# Patient Record
Sex: MALE | Race: WHITE | NOT HISPANIC OR LATINO | Employment: OTHER | URBAN - METROPOLITAN AREA
[De-identification: names, ages, dates, MRNs, and addresses within clinical notes are randomized per-mention and may not be internally consistent; named-entity substitution may affect disease eponyms.]

---

## 2017-09-21 ENCOUNTER — APPOINTMENT (OUTPATIENT)
Dept: LAB | Facility: HOSPITAL | Age: 67
End: 2017-09-21
Attending: UROLOGY

## 2017-09-21 ENCOUNTER — TRANSCRIBE ORDERS (OUTPATIENT)
Dept: ADMINISTRATIVE | Facility: HOSPITAL | Age: 67
End: 2017-09-21

## 2017-09-21 DIAGNOSIS — R97.20 ELEVATED PROSTATE SPECIFIC ANTIGEN (PSA): Primary | ICD-10-CM

## 2017-09-21 DIAGNOSIS — R97.20 ELEVATED PROSTATE SPECIFIC ANTIGEN (PSA): ICD-10-CM

## 2017-09-21 PROCEDURE — 36415 COLL VENOUS BLD VENIPUNCTURE: CPT

## 2017-09-21 PROCEDURE — 84153 ASSAY OF PSA TOTAL: CPT

## 2017-09-21 PROCEDURE — 84154 ASSAY OF PSA FREE: CPT

## 2017-09-23 LAB
PSA FREE MFR SERPL: 14.8 %
PSA FREE SERPL-MCNC: 0.37 NG/ML
PSA SERPL-MCNC: 2.5 NG/ML (ref 0–4)

## 2018-01-18 ENCOUNTER — APPOINTMENT (EMERGENCY)
Dept: RADIOLOGY | Facility: HOSPITAL | Age: 68
DRG: 501 | End: 2018-01-18
Payer: MEDICARE

## 2018-01-18 ENCOUNTER — HOSPITAL ENCOUNTER (INPATIENT)
Facility: HOSPITAL | Age: 68
LOS: 5 days | Discharge: HOME HEALTH | DRG: 501 | End: 2018-01-23
Attending: ORTHOPAEDIC SURGERY | Admitting: ORTHOPAEDIC SURGERY
Payer: MEDICARE

## 2018-01-18 DIAGNOSIS — L02.512 ABSCESS OF LEFT HAND: Primary | ICD-10-CM

## 2018-01-18 DIAGNOSIS — L02.414 ABSCESS OF LEFT FOREARM: ICD-10-CM

## 2018-01-18 LAB
ANION GAP SERPL CALCULATED.3IONS-SCNC: 7 MMOL/L (ref 4–13)
BUN SERPL-MCNC: 14 MG/DL (ref 5–25)
CALCIUM SERPL-MCNC: 8.5 MG/DL (ref 8.3–10.1)
CHLORIDE SERPL-SCNC: 102 MMOL/L (ref 100–108)
CO2 SERPL-SCNC: 29 MMOL/L (ref 21–32)
CREAT SERPL-MCNC: 0.67 MG/DL (ref 0.6–1.3)
CRP SERPL QL: 30.9 MG/L
ERYTHROCYTE [DISTWIDTH] IN BLOOD BY AUTOMATED COUNT: 14.4 % (ref 11.6–15.1)
ERYTHROCYTE [SEDIMENTATION RATE] IN BLOOD: 18 MM/HOUR (ref 0–10)
GFR SERPL CREATININE-BSD FRML MDRD: 101 ML/MIN/1.73SQ M
GLUCOSE SERPL-MCNC: 91 MG/DL (ref 65–140)
HCT VFR BLD AUTO: 43.9 % (ref 36.5–49.3)
HGB BLD-MCNC: 15 G/DL (ref 12–17)
MCH RBC QN AUTO: 30.4 PG (ref 26.8–34.3)
MCHC RBC AUTO-ENTMCNC: 34.2 G/DL (ref 31.4–37.4)
MCV RBC AUTO: 89 FL (ref 82–98)
PLATELET # BLD AUTO: 260 THOUSANDS/UL (ref 149–390)
PMV BLD AUTO: 9.9 FL (ref 8.9–12.7)
POTASSIUM SERPL-SCNC: 4 MMOL/L (ref 3.5–5.3)
RBC # BLD AUTO: 4.94 MILLION/UL (ref 3.88–5.62)
SODIUM SERPL-SCNC: 138 MMOL/L (ref 136–145)
WBC # BLD AUTO: 10.91 THOUSAND/UL (ref 4.31–10.16)

## 2018-01-18 PROCEDURE — 36415 COLL VENOUS BLD VENIPUNCTURE: CPT | Performed by: STUDENT IN AN ORGANIZED HEALTH CARE EDUCATION/TRAINING PROGRAM

## 2018-01-18 PROCEDURE — 73080 X-RAY EXAM OF ELBOW: CPT

## 2018-01-18 PROCEDURE — 80048 BASIC METABOLIC PNL TOTAL CA: CPT | Performed by: STUDENT IN AN ORGANIZED HEALTH CARE EDUCATION/TRAINING PROGRAM

## 2018-01-18 PROCEDURE — 87147 CULTURE TYPE IMMUNOLOGIC: CPT | Performed by: STUDENT IN AN ORGANIZED HEALTH CARE EDUCATION/TRAINING PROGRAM

## 2018-01-18 PROCEDURE — 73120 X-RAY EXAM OF HAND: CPT

## 2018-01-18 PROCEDURE — 85027 COMPLETE CBC AUTOMATED: CPT | Performed by: STUDENT IN AN ORGANIZED HEALTH CARE EDUCATION/TRAINING PROGRAM

## 2018-01-18 PROCEDURE — 87186 SC STD MICRODIL/AGAR DIL: CPT | Performed by: STUDENT IN AN ORGANIZED HEALTH CARE EDUCATION/TRAINING PROGRAM

## 2018-01-18 PROCEDURE — 87070 CULTURE OTHR SPECIMN AEROBIC: CPT | Performed by: STUDENT IN AN ORGANIZED HEALTH CARE EDUCATION/TRAINING PROGRAM

## 2018-01-18 PROCEDURE — 86140 C-REACTIVE PROTEIN: CPT | Performed by: STUDENT IN AN ORGANIZED HEALTH CARE EDUCATION/TRAINING PROGRAM

## 2018-01-18 PROCEDURE — 87205 SMEAR GRAM STAIN: CPT | Performed by: STUDENT IN AN ORGANIZED HEALTH CARE EDUCATION/TRAINING PROGRAM

## 2018-01-18 PROCEDURE — 85652 RBC SED RATE AUTOMATED: CPT | Performed by: STUDENT IN AN ORGANIZED HEALTH CARE EDUCATION/TRAINING PROGRAM

## 2018-01-18 RX ORDER — NICOTINE 21 MG/24HR
1 PATCH, TRANSDERMAL 24 HOURS TRANSDERMAL DAILY
Status: DISCONTINUED | OUTPATIENT
Start: 2018-01-19 | End: 2018-01-23 | Stop reason: HOSPADM

## 2018-01-18 RX ORDER — MORPHINE SULFATE 2 MG/ML
2 INJECTION, SOLUTION INTRAMUSCULAR; INTRAVENOUS EVERY 2 HOUR PRN
Status: DISCONTINUED | OUTPATIENT
Start: 2018-01-18 | End: 2018-01-23 | Stop reason: HOSPADM

## 2018-01-18 RX ORDER — DOCUSATE SODIUM 100 MG/1
100 CAPSULE, LIQUID FILLED ORAL 2 TIMES DAILY
Status: DISCONTINUED | OUTPATIENT
Start: 2018-01-19 | End: 2018-01-23 | Stop reason: HOSPADM

## 2018-01-18 RX ORDER — ONDANSETRON 2 MG/ML
4 INJECTION INTRAMUSCULAR; INTRAVENOUS EVERY 6 HOURS PRN
Status: DISCONTINUED | OUTPATIENT
Start: 2018-01-18 | End: 2018-01-23 | Stop reason: HOSPADM

## 2018-01-18 RX ORDER — OXYCODONE HYDROCHLORIDE 5 MG/1
5 TABLET ORAL EVERY 4 HOURS PRN
Status: DISCONTINUED | OUTPATIENT
Start: 2018-01-18 | End: 2018-01-23 | Stop reason: HOSPADM

## 2018-01-18 RX ORDER — SENNOSIDES 8.6 MG
1 TABLET ORAL DAILY
Status: DISCONTINUED | OUTPATIENT
Start: 2018-01-19 | End: 2018-01-23 | Stop reason: HOSPADM

## 2018-01-18 RX ORDER — ACETAMINOPHEN 325 MG/1
650 TABLET ORAL EVERY 6 HOURS SCHEDULED
Status: DISCONTINUED | OUTPATIENT
Start: 2018-01-18 | End: 2018-01-23 | Stop reason: HOSPADM

## 2018-01-18 RX ORDER — LIDOCAINE HYDROCHLORIDE 10 MG/ML
30 INJECTION, SOLUTION EPIDURAL; INFILTRATION; INTRACAUDAL; PERINEURAL ONCE
Status: COMPLETED | OUTPATIENT
Start: 2018-01-18 | End: 2018-01-18

## 2018-01-18 RX ADMIN — ACETAMINOPHEN 650 MG: 325 TABLET, FILM COATED ORAL at 21:32

## 2018-01-18 RX ADMIN — LIDOCAINE HYDROCHLORIDE 30 ML: 10 INJECTION, SOLUTION EPIDURAL; INFILTRATION; INTRACAUDAL; PERINEURAL at 22:44

## 2018-01-19 ENCOUNTER — ANESTHESIA EVENT (INPATIENT)
Dept: PERIOP | Facility: HOSPITAL | Age: 68
DRG: 501 | End: 2018-01-19
Payer: MEDICARE

## 2018-01-19 ENCOUNTER — ANESTHESIA (INPATIENT)
Dept: PERIOP | Facility: HOSPITAL | Age: 68
DRG: 501 | End: 2018-01-19
Payer: MEDICARE

## 2018-01-19 PROBLEM — L02.512 ABSCESS OF LEFT HAND: Status: ACTIVE | Noted: 2018-01-18

## 2018-01-19 PROBLEM — L02.414 ABSCESS OF LEFT FOREARM: Status: ACTIVE | Noted: 2018-01-19

## 2018-01-19 LAB
ANION GAP SERPL CALCULATED.3IONS-SCNC: 6 MMOL/L (ref 4–13)
BUN SERPL-MCNC: 14 MG/DL (ref 5–25)
CALCIUM SERPL-MCNC: 8.6 MG/DL (ref 8.3–10.1)
CHLORIDE SERPL-SCNC: 102 MMOL/L (ref 100–108)
CO2 SERPL-SCNC: 28 MMOL/L (ref 21–32)
CREAT SERPL-MCNC: 0.67 MG/DL (ref 0.6–1.3)
ERYTHROCYTE [DISTWIDTH] IN BLOOD BY AUTOMATED COUNT: 14.5 % (ref 11.6–15.1)
GFR SERPL CREATININE-BSD FRML MDRD: 101 ML/MIN/1.73SQ M
GLUCOSE SERPL-MCNC: 88 MG/DL (ref 65–140)
HCT VFR BLD AUTO: 44.3 % (ref 36.5–49.3)
HGB BLD-MCNC: 14.8 G/DL (ref 12–17)
MCH RBC QN AUTO: 30 PG (ref 26.8–34.3)
MCHC RBC AUTO-ENTMCNC: 33.4 G/DL (ref 31.4–37.4)
MCV RBC AUTO: 90 FL (ref 82–98)
PLATELET # BLD AUTO: 262 THOUSANDS/UL (ref 149–390)
PMV BLD AUTO: 10.2 FL (ref 8.9–12.7)
POTASSIUM SERPL-SCNC: 4 MMOL/L (ref 3.5–5.3)
RBC # BLD AUTO: 4.94 MILLION/UL (ref 3.88–5.62)
SODIUM SERPL-SCNC: 136 MMOL/L (ref 136–145)
WBC # BLD AUTO: 10.68 THOUSAND/UL (ref 4.31–10.16)

## 2018-01-19 PROCEDURE — 0KB80ZZ EXCISION OF LEFT UPPER ARM MUSCLE, OPEN APPROACH: ICD-10-PCS | Performed by: ORTHOPAEDIC SURGERY

## 2018-01-19 PROCEDURE — 80048 BASIC METABOLIC PNL TOTAL CA: CPT | Performed by: STUDENT IN AN ORGANIZED HEALTH CARE EDUCATION/TRAINING PROGRAM

## 2018-01-19 PROCEDURE — 87070 CULTURE OTHR SPECIMN AEROBIC: CPT | Performed by: ORTHOPAEDIC SURGERY

## 2018-01-19 PROCEDURE — 85027 COMPLETE CBC AUTOMATED: CPT | Performed by: STUDENT IN AN ORGANIZED HEALTH CARE EDUCATION/TRAINING PROGRAM

## 2018-01-19 PROCEDURE — 87147 CULTURE TYPE IMMUNOLOGIC: CPT | Performed by: ORTHOPAEDIC SURGERY

## 2018-01-19 PROCEDURE — 87186 SC STD MICRODIL/AGAR DIL: CPT | Performed by: ORTHOPAEDIC SURGERY

## 2018-01-19 PROCEDURE — 99285 EMERGENCY DEPT VISIT HI MDM: CPT

## 2018-01-19 PROCEDURE — 87205 SMEAR GRAM STAIN: CPT | Performed by: ORTHOPAEDIC SURGERY

## 2018-01-19 PROCEDURE — 87116 MYCOBACTERIA CULTURE: CPT | Performed by: ORTHOPAEDIC SURGERY

## 2018-01-19 PROCEDURE — 87102 FUNGUS ISOLATION CULTURE: CPT | Performed by: ORTHOPAEDIC SURGERY

## 2018-01-19 PROCEDURE — 87206 SMEAR FLUORESCENT/ACID STAI: CPT | Performed by: ORTHOPAEDIC SURGERY

## 2018-01-19 PROCEDURE — 87075 CULTR BACTERIA EXCEPT BLOOD: CPT | Performed by: ORTHOPAEDIC SURGERY

## 2018-01-19 PROCEDURE — 87176 TISSUE HOMOGENIZATION CULTR: CPT | Performed by: ORTHOPAEDIC SURGERY

## 2018-01-19 PROCEDURE — 0KBD0ZZ EXCISION OF LEFT HAND MUSCLE, OPEN APPROACH: ICD-10-PCS | Performed by: ORTHOPAEDIC SURGERY

## 2018-01-19 RX ORDER — SODIUM CHLORIDE 9 MG/ML
75 INJECTION, SOLUTION INTRAVENOUS CONTINUOUS
Status: DISCONTINUED | OUTPATIENT
Start: 2018-01-19 | End: 2018-01-23 | Stop reason: HOSPADM

## 2018-01-19 RX ORDER — TRAMADOL HYDROCHLORIDE 50 MG/1
50 TABLET ORAL EVERY 6 HOURS SCHEDULED
Status: DISCONTINUED | OUTPATIENT
Start: 2018-01-19 | End: 2018-01-23 | Stop reason: HOSPADM

## 2018-01-19 RX ORDER — BUPROPION HYDROCHLORIDE 100 MG/1
50 TABLET ORAL 2 TIMES DAILY
Status: DISCONTINUED | OUTPATIENT
Start: 2018-01-19 | End: 2018-01-23 | Stop reason: HOSPADM

## 2018-01-19 RX ORDER — EPHEDRINE SULFATE 50 MG/ML
INJECTION, SOLUTION INTRAVENOUS AS NEEDED
Status: DISCONTINUED | OUTPATIENT
Start: 2018-01-19 | End: 2018-01-19 | Stop reason: SURG

## 2018-01-19 RX ORDER — ONDANSETRON 2 MG/ML
INJECTION INTRAMUSCULAR; INTRAVENOUS AS NEEDED
Status: DISCONTINUED | OUTPATIENT
Start: 2018-01-19 | End: 2018-01-19 | Stop reason: SURG

## 2018-01-19 RX ORDER — OMEPRAZOLE 20 MG/1
25 CAPSULE, DELAYED RELEASE ORAL 2 TIMES DAILY
COMMUNITY
End: 2020-03-24 | Stop reason: HOSPADM

## 2018-01-19 RX ORDER — PANTOPRAZOLE SODIUM 20 MG/1
20 TABLET, DELAYED RELEASE ORAL
Status: DISCONTINUED | OUTPATIENT
Start: 2018-01-20 | End: 2018-01-23 | Stop reason: HOSPADM

## 2018-01-19 RX ORDER — FENTANYL CITRATE/PF 50 MCG/ML
25 SYRINGE (ML) INJECTION
Status: DISCONTINUED | OUTPATIENT
Start: 2018-01-19 | End: 2018-01-19 | Stop reason: HOSPADM

## 2018-01-19 RX ORDER — ESCITALOPRAM OXALATE 20 MG/1
50 TABLET ORAL 2 TIMES DAILY
COMMUNITY
End: 2020-03-24 | Stop reason: HOSPADM

## 2018-01-19 RX ORDER — PRAVASTATIN SODIUM 20 MG
20 TABLET ORAL DAILY
COMMUNITY
End: 2020-03-24 | Stop reason: HOSPADM

## 2018-01-19 RX ORDER — PRAVASTATIN SODIUM 20 MG
20 TABLET ORAL DAILY
Status: DISCONTINUED | OUTPATIENT
Start: 2018-01-20 | End: 2018-01-23 | Stop reason: HOSPADM

## 2018-01-19 RX ORDER — FLUOXETINE 10 MG/1
10 CAPSULE ORAL DAILY
Status: DISCONTINUED | OUTPATIENT
Start: 2018-01-20 | End: 2018-01-23 | Stop reason: HOSPADM

## 2018-01-19 RX ORDER — LIDOCAINE HYDROCHLORIDE 10 MG/ML
INJECTION, SOLUTION INFILTRATION; PERINEURAL AS NEEDED
Status: DISCONTINUED | OUTPATIENT
Start: 2018-01-19 | End: 2018-01-19 | Stop reason: SURG

## 2018-01-19 RX ORDER — METHOCARBAMOL 750 MG/1
750 TABLET, FILM COATED ORAL 2 TIMES DAILY
COMMUNITY
End: 2020-03-24 | Stop reason: HOSPADM

## 2018-01-19 RX ORDER — PROPOFOL 10 MG/ML
INJECTION, EMULSION INTRAVENOUS AS NEEDED
Status: DISCONTINUED | OUTPATIENT
Start: 2018-01-19 | End: 2018-01-19 | Stop reason: SURG

## 2018-01-19 RX ORDER — FENTANYL CITRATE 50 UG/ML
INJECTION, SOLUTION INTRAMUSCULAR; INTRAVENOUS AS NEEDED
Status: DISCONTINUED | OUTPATIENT
Start: 2018-01-19 | End: 2018-01-19 | Stop reason: SURG

## 2018-01-19 RX ORDER — ONDANSETRON 2 MG/ML
4 INJECTION INTRAMUSCULAR; INTRAVENOUS ONCE AS NEEDED
Status: DISCONTINUED | OUTPATIENT
Start: 2018-01-19 | End: 2018-01-19 | Stop reason: HOSPADM

## 2018-01-19 RX ORDER — BUPROPION HYDROCHLORIDE 100 MG/1
50 TABLET ORAL 2 TIMES DAILY
COMMUNITY
End: 2020-03-24 | Stop reason: HOSPADM

## 2018-01-19 RX ORDER — METHOCARBAMOL 750 MG/1
750 TABLET, FILM COATED ORAL 2 TIMES DAILY
Status: DISCONTINUED | OUTPATIENT
Start: 2018-01-19 | End: 2018-01-23 | Stop reason: HOSPADM

## 2018-01-19 RX ORDER — MAGNESIUM HYDROXIDE 1200 MG/15ML
LIQUID ORAL AS NEEDED
Status: DISCONTINUED | OUTPATIENT
Start: 2018-01-19 | End: 2018-01-19 | Stop reason: HOSPADM

## 2018-01-19 RX ORDER — FLUOXETINE 10 MG/1
10 CAPSULE ORAL DAILY
COMMUNITY
End: 2020-03-24 | Stop reason: HOSPADM

## 2018-01-19 RX ORDER — METHOCARBAMOL 750 MG/1
750 TABLET, FILM COATED ORAL EVERY 6 HOURS PRN
Status: DISCONTINUED | OUTPATIENT
Start: 2018-01-19 | End: 2018-01-19

## 2018-01-19 RX ORDER — CEFAZOLIN SODIUM 1 G/3ML
INJECTION, POWDER, FOR SOLUTION INTRAMUSCULAR; INTRAVENOUS AS NEEDED
Status: DISCONTINUED | OUTPATIENT
Start: 2018-01-19 | End: 2018-01-19 | Stop reason: SURG

## 2018-01-19 RX ORDER — ACETAMINOPHEN 325 MG/1
650 TABLET ORAL EVERY 6 HOURS PRN
Status: DISCONTINUED | OUTPATIENT
Start: 2018-01-19 | End: 2018-01-23 | Stop reason: HOSPADM

## 2018-01-19 RX ORDER — SODIUM CHLORIDE, SODIUM LACTATE, POTASSIUM CHLORIDE, CALCIUM CHLORIDE 600; 310; 30; 20 MG/100ML; MG/100ML; MG/100ML; MG/100ML
INJECTION, SOLUTION INTRAVENOUS CONTINUOUS PRN
Status: DISCONTINUED | OUTPATIENT
Start: 2018-01-19 | End: 2018-01-19 | Stop reason: SURG

## 2018-01-19 RX ADMIN — NICOTINE 1 PATCH: 21 PATCH, EXTENDED RELEASE TRANSDERMAL at 09:16

## 2018-01-19 RX ADMIN — FENTANYL CITRATE 50 MCG: 50 INJECTION, SOLUTION INTRAMUSCULAR; INTRAVENOUS at 16:29

## 2018-01-19 RX ADMIN — METHOCARBAMOL 750 MG: 750 TABLET ORAL at 19:12

## 2018-01-19 RX ADMIN — OXYCODONE HYDROCHLORIDE 5 MG: 5 TABLET ORAL at 04:34

## 2018-01-19 RX ADMIN — TRAMADOL HYDROCHLORIDE 50 MG: 50 TABLET, FILM COATED ORAL at 19:12

## 2018-01-19 RX ADMIN — ONDANSETRON 4 MG: 2 INJECTION INTRAMUSCULAR; INTRAVENOUS at 23:36

## 2018-01-19 RX ADMIN — HYDROMORPHONE HYDROCHLORIDE 0.2 MG: 1 INJECTION, SOLUTION INTRAMUSCULAR; INTRAVENOUS; SUBCUTANEOUS at 17:30

## 2018-01-19 RX ADMIN — ACETAMINOPHEN 650 MG: 325 TABLET, FILM COATED ORAL at 05:58

## 2018-01-19 RX ADMIN — METHOCARBAMOL 750 MG: 750 TABLET ORAL at 07:39

## 2018-01-19 RX ADMIN — HYDROMORPHONE HYDROCHLORIDE 0.2 MG: 1 INJECTION, SOLUTION INTRAMUSCULAR; INTRAVENOUS; SUBCUTANEOUS at 17:15

## 2018-01-19 RX ADMIN — HYDROMORPHONE HYDROCHLORIDE 0.2 MG: 1 INJECTION, SOLUTION INTRAMUSCULAR; INTRAVENOUS; SUBCUTANEOUS at 17:25

## 2018-01-19 RX ADMIN — CEFAZOLIN 2000 MG: 1 INJECTION, POWDER, FOR SOLUTION INTRAVENOUS at 16:06

## 2018-01-19 RX ADMIN — FENTANYL CITRATE 25 MCG: 50 INJECTION INTRAMUSCULAR; INTRAVENOUS at 16:56

## 2018-01-19 RX ADMIN — SODIUM CHLORIDE 75 ML/HR: 0.9 INJECTION, SOLUTION INTRAVENOUS at 17:32

## 2018-01-19 RX ADMIN — EPHEDRINE SULFATE 10 MG: 50 INJECTION, SOLUTION INTRAMUSCULAR; INTRAVENOUS; SUBCUTANEOUS at 16:10

## 2018-01-19 RX ADMIN — CEFAZOLIN SODIUM 2000 MG: 2 SOLUTION INTRAVENOUS at 23:36

## 2018-01-19 RX ADMIN — OXYCODONE HYDROCHLORIDE 5 MG: 5 TABLET ORAL at 21:07

## 2018-01-19 RX ADMIN — LIDOCAINE HYDROCHLORIDE 50 MG: 10 INJECTION, SOLUTION INFILTRATION; PERINEURAL at 15:44

## 2018-01-19 RX ADMIN — HYDROMORPHONE HYDROCHLORIDE 0.2 MG: 1 INJECTION, SOLUTION INTRAMUSCULAR; INTRAVENOUS; SUBCUTANEOUS at 17:20

## 2018-01-19 RX ADMIN — ACETAMINOPHEN 650 MG: 325 TABLET, FILM COATED ORAL at 12:34

## 2018-01-19 RX ADMIN — FENTANYL CITRATE 25 MCG: 50 INJECTION INTRAMUSCULAR; INTRAVENOUS at 16:59

## 2018-01-19 RX ADMIN — TRAMADOL HYDROCHLORIDE 50 MG: 50 TABLET, FILM COATED ORAL at 23:37

## 2018-01-19 RX ADMIN — FENTANYL CITRATE 25 MCG: 50 INJECTION INTRAMUSCULAR; INTRAVENOUS at 16:49

## 2018-01-19 RX ADMIN — HYDROMORPHONE HYDROCHLORIDE 0.2 MG: 1 INJECTION, SOLUTION INTRAMUSCULAR; INTRAVENOUS; SUBCUTANEOUS at 17:05

## 2018-01-19 RX ADMIN — ONDANSETRON 4 MG: 2 INJECTION INTRAMUSCULAR; INTRAVENOUS at 16:15

## 2018-01-19 RX ADMIN — PROPOFOL 200 MG: 10 INJECTION, EMULSION INTRAVENOUS at 15:44

## 2018-01-19 RX ADMIN — HYDROMORPHONE HYDROCHLORIDE 0.2 MG: 1 INJECTION, SOLUTION INTRAMUSCULAR; INTRAVENOUS; SUBCUTANEOUS at 17:10

## 2018-01-19 RX ADMIN — ACETAMINOPHEN 650 MG: 325 TABLET, FILM COATED ORAL at 23:37

## 2018-01-19 RX ADMIN — HYDROMORPHONE HYDROCHLORIDE 0.2 MG: 1 INJECTION, SOLUTION INTRAMUSCULAR; INTRAVENOUS; SUBCUTANEOUS at 17:35

## 2018-01-19 RX ADMIN — SODIUM CHLORIDE, SODIUM LACTATE, POTASSIUM CHLORIDE, AND CALCIUM CHLORIDE: .6; .31; .03; .02 INJECTION, SOLUTION INTRAVENOUS at 15:40

## 2018-01-19 RX ADMIN — Medication 3 G: at 06:25

## 2018-01-19 RX ADMIN — HYDROMORPHONE HYDROCHLORIDE 0.2 MG: 1 INJECTION, SOLUTION INTRAMUSCULAR; INTRAVENOUS; SUBCUTANEOUS at 17:40

## 2018-01-19 RX ADMIN — OXYCODONE HYDROCHLORIDE 5 MG: 5 TABLET ORAL at 09:16

## 2018-01-19 RX ADMIN — METHOCARBAMOL 750 MG: 750 TABLET ORAL at 01:55

## 2018-01-19 RX ADMIN — FENTANYL CITRATE 50 MCG: 50 INJECTION, SOLUTION INTRAMUSCULAR; INTRAVENOUS at 16:01

## 2018-01-19 RX ADMIN — Medication 3 G: at 00:34

## 2018-01-19 RX ADMIN — OXYCODONE HYDROCHLORIDE 5 MG: 5 TABLET ORAL at 00:34

## 2018-01-19 RX ADMIN — BUPROPION HYDROCHLORIDE 50 MG: 100 TABLET, FILM COATED ORAL at 21:08

## 2018-01-19 RX ADMIN — Medication 3 G: at 12:34

## 2018-01-19 RX ADMIN — FENTANYL CITRATE 25 MCG: 50 INJECTION INTRAMUSCULAR; INTRAVENOUS at 16:53

## 2018-01-19 NOTE — H&P
Orthopedics   Neponsetdanika Schreiber  74 y o  male MRN: [id number]  Unit/Bed#: ED 14      Chief Complaint:   left Hand and elbow pain    HPI:   74 y  o male RHDcomplaining of left hand and elbow erythema and pain  Patient says he cut the dorsal aspect of his thumb about 1 week ago on a cut off wheel  About 4 days ago he began to notice an area under the edge of his bandage that looked like a "pimple " He admits to popping the pustule with a pin a couple times and expressing pus  He began to have subjective chills last night and reported to the ED in [place]  He began to have swelling near his elbow 3 days ago  Pain is well localized to the thumb and elbow, is tender to palpation and improves with rest     Patient offers no other complaints at this time  Denies n/v/sob/cp/ha    Review Of Systems:   · Skin: See exam below  · Neuro: See HPI  · Musculoskeletal: See HPI  · 14 point review of systems negative except as stated above     Past Medical History:   Past Medical History:   Diagnosis Date    GERD (gastroesophageal reflux disease)     Hyperlipidemia     Hypertension        Past Surgical History:   History reviewed  No pertinent surgical history  Family History:  Family history reviewed and non-contributory  History reviewed  No pertinent family history  Social History:  Social History     Social History    Marital status: Single     Spouse name: N/A    Number of children: N/A    Years of education: N/A     Social History Main Topics    Smoking status: Current Every Day Smoker     Packs/day: 0 50     Types: Cigarettes    Smokeless tobacco: Never Used    Alcohol use No    Drug use: No    Sexual activity: Not Asked     Other Topics Concern    None     Social History Narrative    None       Allergies:   No Known Allergies        Labs:  No results found for: HCT, HGB, PT, INR, WBC, ESR, CRP    Meds:  No current facility-administered medications for this encounter     No current outpatient prescriptions on file  Blood Culture:   No results found for: BLOODCX    Wound Culture:   No results found for: WOUNDCULT    Ins and Outs:  No intake/output data recorded  Physical Exam:   /94 (BP Location: Right arm)   Pulse 77   Temp 99 2 °F (37 3 °C) (Oral)   Resp 20   Wt 98 9 kg (218 lb)   SpO2 97%   Gen: Alert and oriented to person, place, time  HEENT: EOMI, eyes clear, moist mucus membranes, hearing intact  Respiratory: Bilateral chest rise  No audible wheezing found  Cardiovascular: Regular Rate and Rhythm  Abdomen: soft nontender/nondistended  · Musculoskeletal: leftUpper Extremity  · Skin: Erythema over thenar eminence with 2 pustules and a 4x4cm area of erythema distal to elbow  · ROM nontender at thumb or elbow  · TTP over areas of erythema  · Sensation intact Radial, Ulna and Median  · 5/5 motor to Radial, Ulna and Median    Radiology:   I personally reviewed the films  X-rays of left elbow and hand show no osseus abnormalities    Procedure:  Patient underwent bedside incision and drainage of his left hand  15cc of 1% lidocaine without epinephrine was used to anesthetize the region of cellulitis  A 15 blade was use to make a poke-hole incision over the pustule and pus was expressed from the wound  Cultures were taken from deep within the abscess and 1/4 inch packing tape was placed in the wound  Wound was covered with Xeroform, gauze, Krilex and a splint was placed for immobilization  Patient tolerated the procedure well and was neurovascularly intact after the procedure      _*_*_*_*_*_*_*_*_*_*_*_*_*_*_*_*_*_*_*_*_*_*_*_*_*_*_*_*_*_*_*_*_*_*_*_*_*_*_*_*_*    Assessment:  74 y  o male with  left hand and elbow cellulitis      Plan:   · Start Unasyn  · F/U Wound Cx  · Nonweight bearing to left upper extremity in splint  · Heat to affected area  · Analgesics for pain  · Dispo: Ortho will follow      Precious Wright MD

## 2018-01-19 NOTE — PROGRESS NOTES
Orthopedics   Manjinder Kee  74 y o  male MRN: [id number]  Unit/Bed#: CW3 345-01    HPI:   74 y  o male with L thenar eminance abscess and L elbow abscess  In splint  Labs:    0  Lab Value Date/Time   HCT 44 3 01/19/2018 0530   HCT 43 9 01/18/2018 2126   HGB 14 8 01/19/2018 0530   HGB 15 0 01/18/2018 2126   WBC 10 68 (H) 01/19/2018 0530   WBC 10 91 (H) 01/18/2018 2126   ESR 18 (H) 01/18/2018 2126   CRP 30 9 (H) 01/18/2018 2126       Meds:    Current Facility-Administered Medications:     acetaminophen (TYLENOL) tablet 650 mg, 650 mg, Oral, Q6H [facility], Carmelina Arroyo MD, 650 mg at 01/19/18 0558    ampicillin-sulbactam (UNASYN) 3 g in sodium chloride 0 9 % 100 mL IVPB, 3 g, Intravenous, Q6H, Carmelina Arroyo MD, Last Rate: 200 mL/hr at 01/19/18 0625, 3 g at 01/19/18 1856    docusate sodium (COLACE) capsule 100 mg, 100 mg, Oral, BID, Carmelina Arroyo MD    enoxaparin (LOVENOX) subcutaneous injection 40 mg, 40 mg, Subcutaneous, Daily, Carmelina Arroyo MD    influenza inactivated quadrivalent vaccine (FLULAVAL) IM injection 0 5 mL, 0 5 mL, Intramuscular, Prior to discharge, Macario Mcbride MD    methocarbamol (ROBAXIN) tablet 750 mg, 750 mg, Oral, Q6H PRN, Carmelina Arroyo MD, 750 mg at 01/19/18 0155    morphine injection 2 mg, 2 mg, Intravenous, Q2H PRN, Carmelina Arroyo MD  [facility]  nicotine (NICODERM CQ) 21 mg/24 hr TD 24 hr patch 1 patch, 1 patch, Transdermal, Daily, Carmelina Arroyo MD    ondansetron [facility]) injection 4 mg, 4 mg, Intravenous, Q6H PRN, Carmelina Arroyo MD    oxyCODONE (ROXICODONE) IR tablet 5 mg, 5 mg, Oral, Q4H PRN, Carmelina Arroyo MD, 5 mg at 01/19/18 0434    senna (SENOKOT) tablet 8 6 mg, 1 tablet, Oral, Daily, Carmelina Arroyo MD    Blood Culture:   No results found for: BLOODCX    Wound Culture:   No results found for: WOUNDCULT    Ins and Outs:  No intake/output data recorded            Physical Exam:   BP (!) 172/93 (BP Location: Right arm)   Pulse 70 Temp 97 6 °F (36 4 °C) (Oral)   Resp 18   Ht 5' 10" (1 778 m)   Wt 98 9 kg (218 lb)   SpO2 94%   BMI 31 28 kg/m²   Gen: Alert and oriented to person, place, time  Abdomen: soft nontender/nondistended  · Musculoskeletal: leftUpper Extremity  · Splint in place  · Erythema present over elbow and thenar eminance  · TTP over abscess and wound of L hand and elbow  · Sensation intact Radial, Ulna and Median  · 5/5 motor to Radial, Ulna and Median    *_*_*_*_*_*_*_*_*_*_*_*_*_*_*_*_*_*_*_*_*_*_*_*_*_*_*_*_*_*_*_*_*_*_*_*_*_*_*_*_*    Assessment:  74 y  o male with  left hand and elbow cellulitis and abscess    Plan:   · abx: Unasyn  · F/U Wound Cx  · Nonweight bearing to left upper extremity in splint  · Heat to affected area  · Analgesics for pain  · To OR for release of L elbow abscess  · Dispo: Ortho will follow      Richard Aguilar MD

## 2018-01-19 NOTE — ANESTHESIA PREPROCEDURE EVALUATION
Review of Systems/Medical History  Patient summary reviewed  Chart reviewed      Cardiovascular  Hyperlipidemia, Hypertension ,    Pulmonary  Smoker cigarette smoker  Cumulative Pack Years: 46, COPD mild- PRN medicaiton , Shortness of breath,        GI/Hepatic    GERD ,        Negative  ROS        Endo/Other    Obesity    GYN       Hematology  Negative hematology ROS      Musculoskeletal  Back pain , lumbar pain, Osteoarthritis,   Comment: Left hand abscess      Neurology  Negative neurology ROS      Psychology   Depression , being treated for depression,              Physical Exam    Airway    Mallampati score: II  TM Distance: >3 FB  Neck ROM: full     Dental   No notable dental hx     Cardiovascular  Rhythm: regular, Rate: normal,     Pulmonary  Breath sounds clear to auscultation,     Other Findings        Anesthesia Plan  ASA Score- 3     Anesthesia Type- general with ASA Monitors  Additional Monitors:   Airway Plan: LMA  Plan Factors-    Induction- intravenous  Postoperative Plan-     Informed Consent- Anesthetic plan and risks discussed with patient  I personally reviewed this patient with the CRNA  Discussed and agreed on the Anesthesia Plan with the CRNA  Moises Dodge

## 2018-01-19 NOTE — CASE MANAGEMENT
Initial Clinical Review    Admission: Date/Time/Statement: 1/18/18 @ 2329     Orders Placed This Encounter   Procedures    Inpatient Admission     Standing Status:   Standing     Number of Occurrences:   1     Order Specific Question:   Admitting Physician     Answer:   Biju Baires     Order Specific Question:   Level of Care     Answer:   Med Surg [16]     Order Specific Question:   Estimated length of stay     Answer:   More than 2 Midnights     Order Specific Question:   Certification     Answer:   I certify that inpatient services are medically necessary for this patient for a duration of greater than two midnights  See H&P and MD Progress Notes for additional information about the patient's course of treatment  ED: Date/Time/Mode of Arrival:   ED Arrival Information     Expected Arrival Acuity Means of Arrival Escorted By Service Admission Type    1/18/2018 18:25 1/18/2018 20:40 Urgent Ambulance [facility] Orth[facility] Surgery Urgent    Arrival Complaint    L hand cellulitis/abscess, L elbow abscess          Chief Complaint:   Chief Complaint   Patient presents with    Hand Swelling     pt is a transfer from [address] [place] [address]  pt has abcess on left palm and left elbow  History of Illness: 74 y  o male RHDcomplaining of left hand and elbow erythema and pain  Patient says he cut the dorsal aspect of his thumb about 1 week ago on a cut off wheel  About 4 days ago he began to notice an area under the edge of his bandage that looked like a "pimple " He admits to popping the pustule with a pin a couple times and expressing pus  He began to have subjective chills last night and reported to the ED in [address] [place] [address]  He began to have swelling near his elbow 3 days ago   Pain is well localized to the thumb and elbow, is tender to palpation and improves with rest     ED Vital Signs:   ED Triage Vitals [01/18/18 2048]   Temperature Pulse Respirations Blood Pressure SpO2   99 2 °F (37 3 °C) 77 20 160/94 97 %      Temp Source Heart Rate Source Patient Position - Orthostatic VS BP Location FiO2 (%)   Oral Monitor Lying Right arm --      Pain Score       5        Wt Readings from Last 1 Encounters:   01/19/18 98 9 kg (218 lb)       Vital Signs (abnormal): WNL    Abnormal Labs:    01/18/18 2126    WBC 4 31 - 10 16 Thousand/uL 10 91       01/18/18 2126     Sed Rate 0 - 10 mm/hour 18       01/18/18 2126     CRP <3 0 mg/L 30 9       Diagnostic Test Results: Xray Left Hand - 1   1st digit swelling and punctate hyperdensity within the palmar soft tissues overlying the 1st middle phalanx which may represent a foreign body  2   No evidence of osseous erosion to suggest osteomyelitis  Xray Left Elbow - 1  No evidence of osseous erosion to suggest osteomyelitis  2   No elbow joint effusion         ED Treatment:   Medication Administration from 01/18/2018 1825 to 01/19/2018 0122       Date/Time Order Dose Route Action     01/19/2018 0034 oxyCODONE (ROXICODONE) IR tablet 5 mg 5 mg Oral Given     01/18/2018 2132 acetaminophen (TYLENOL) tablet 650 mg 650 mg Oral Given     01/18/2018 2244 lidocaine (PF) (XYLOCAINE-MPF) 1 % injection 30 mL 30 mL Infiltration Given     01/19/2018 0034 ampicillin-sulbactam (UNASYN) 3 g in sodium chloride 0 9 % 100 mL IVPB 3 g Intravenous New Bag          Past Medical/Surgical History: Active Ambulatory Problems     Diagnosis Date Noted    No Active Ambulatory Problems     Resolved Ambulatory Problems     Diagnosis Date Noted    No Resolved Ambulatory Problems     Past Medical History:   Diagnosis Date    GERD (gastroesophageal reflux disease)     Hyperlipidemia     Hypertension        Admitting Diagnosis: Cellulitis [L03 90]    Age/Sex: 74 y o  male    Assessment:  74 y  o male with  left hand and elbow cellulitis      Plan:   · Start Unasyn  · F/U Wound Cx  · Nonweight bearing to left upper extremity in splint  · Heat to affected area  · Analgesics for pain  · Dispo: Ortho will follow      Admission Orders:  Wound culture and Gram stain    Scheduled Meds:   acetaminophen 650 mg Oral Q6H [facility]   ampicillin-sulbactam 3 g Intravenous Q6H   docusate sodium 100 mg Oral BID   enoxaparin 40 mg Subcutaneous Daily   nicotine 1 patch Transdermal Daily   senna 1 tablet Oral Daily     Continuous Infusions:    PRN Meds: influenza vaccine    Methocarbamol po x2    morphine injection    ondansetron    oxyCODONE po x3    ---------------------------------------------------------------------------------------------------------------    1/19 Physician progress notes:  Assessment:  74 y  o male with  left hand and elbow cellulitis and abscess     Plan:   · abx: Unasyn  · F/U Wound Cx  · Nonweight bearing to left upper extremity in splint  · Heat to affected area  · Analgesics for pain  · To OR for release of L elbow abscess  · Dispo: Ortho will follow

## 2018-01-19 NOTE — OP NOTE
OPERATIVE REPORT  PATIENT NAME: Thomas Brower  :  1950  MRN: [id number]  Pt Location: [facility] OR    SURGERY DATE: 18    Surgeon(s) and Role:     * Adiel Ferreira MD - Primary     * Fred Mccloud MD - Assisting    Pre-Op Diagnosis:  Abscess of left hand [L02 512]    Post-Op Diagnosis Codes:     * Abscess of left hand [L02 512]     * Abscess of left forearm [L02 414]    Procedure(s):  DEBRIDEMENT HAND/FINGER [facility] OUT): left thumb - deep abscess measuring 3 cm x 1 cm x 1 cm (Left)  DEBRIDEMENT UPPER EXTREMITY [facility] OUT): left elbow deep abscess over the olecranon measuring 4 cm x 4 cm x 2 cm (Left)    Specimen(s):  Culture left thumb and left olecranon abscess for aerobic, anaerobic, AFB, fungus, and Gram stain    Estimated Blood Loss:   Minimal      Anesthesia Type:   General    Operative Indications: The patient has a history of abscess left thumb and forearm that was recalcitrant to conservative management  The decision was made to bring the patient to the operating room for irrigation and debridement  Risks of the procedure were explained which include, but are not limited to bleeding; infection; damage to nerves, arteries,veins, tendons; scar; pain; need for reoperation; failure to give desired result; and risks of anaesthesia  All questions were answered to satisfaction and they were willing to proceed  Operative Findings:  Purulent abscess left olecranon and left thumb    Complications:   None    Procedure and Technique:  After the patient, site, and procedure were identified, the patient was brought into the operating room in a supine position  General anaesthesia was provided  A well padded tourniquet was applied to the extremity, set at 250 mmHg  The  left upper extremity was then prepped and drapped in a normal, sterile, orthopedic fashion      After the patient, site, and procedure were identified the left arm was elevated f 2 minutes and a tourniquet was inflated to 250 mm of mercury  Starting at the olecranon, a 3 cm incision was then made  We dissected down through the skin and subcutaneous tissues immediately coming upon a purulent collection of fluid measuring 4 cm x 4 cm x 2 cm  Utilizing sharp instrumentations, the wound was debrided with excision of all nonviable and purulent tissue including skin, subcutaneous tissue, muscle, and fascia  Cultures were sent  Once this was done, this was irrigated with 3 L of sterile saline solution  This was closed in an interrupted fashion over iodoform packing  Attention was then turned towards the left thumb  A 2 cm incision was made over the abscess in the volar aspect of the hand over the thenar space  We dissected down through the skin and subcutaneous tissues immediately came upon and abscess with significant purulence  Cultures were then taken as above  Care was taken to protect the neurovascular and tendinous structures  Utilizing sharp instrumentations, the wound was debrided with excision of all nonviable and purulent tissue including skin, subcutaneous tissue, muscle, and fascia  Cultures were sent  Once this was done, this was irrigated with 3 L of sterile saline solution  This was closed in an interrupted fashion over iodoform packing  At the completion of the procedure, hemostasis was obtained with cautery and direct pressure  Sterile dressings were applied, including Xeroform, gauze, tweeners, webril, ACE  Please note, all sponge, needle, and instrument counts were correct prior to closure  The patient tolerated the procedure well       I was present for the entire procedure    Patient Disposition:  PACU  and hemodynamically stable    SIGNATURE: Troy Mejia MD  DATE: 01/19/18  TIME: 4:20 PM

## 2018-01-19 NOTE — ANESTHESIA POSTPROCEDURE EVALUATION
Post-Op Assessment Note      CV Status:  Stable    Mental Status:  Alert and awake    Hydration Status:  Euvolemic    PONV Controlled:  Controlled    Airway Patency:  Patent    Post Op Vitals Reviewed: Yes          Staff: Anesthesiologist, CRNA           BP   148/80   Temp   98 4   Pulse  88   Resp   16   SpO2   96

## 2018-01-19 NOTE — PLAN OF CARE
Problem: PAIN - ADULT  Goal: Verbalizes/displays adequate comfort level or baseline comfort level  Interventions:  - Encourage patient to monitor pain and request assistance  - Assess pain using appropriate pain scale  - Administer analgesics based on type and severity of pain and evaluate response  - Implement non-pharmacological measures as appropriate and evaluate response  - Consider cultural and social influences on pain and pain management  - Notify physician/advanced practitioner if interventions unsuccessful or patient reports new pain   Outcome: Progressing      Problem: INFECTION - ADULT  Goal: Absence or prevention of progression during hospitalization  INTERVENTIONS:  - Assess and monitor for signs and symptoms of infection  - Monitor lab/diagnostic results  - Monitor all insertion sites, i e  indwelling lines, tubes, and drains  - Monitor endotracheal (as able) and nasal secretions for changes in amount and color  - [place] appropriate cooling/warming therapies per order  - Administer medications as ordered  - Instruct and encourage patient and family to use good hand hygiene technique  - Identify and instruct in appropriate isolation precautions for identified infection/condition   Outcome: Progressing    Goal: Absence of fever/infection during neutropenic period  INTERVENTIONS:  - Monitor WBC  - Implement neutropenic guidelines   Outcome: Progressing      Problem: SAFETY ADULT  Goal: Patient will remain free of falls  INTERVENTIONS:  - Assess patient frequently for physical needs  -  Identify cognitive and physical deficits and behaviors that affect risk of falls    -  [place] fall precautions as indicated by assessment   - Educate patient/family on patient safety including physical limitations  - Instruct patient to call for assistance with activity based on assessment  - Modify environment to reduce risk of injury  - Consider OT/PT consult to assist with strengthening/mobility   Outcome: Progressing    Goal: Maintain or return to baseline ADL function  INTERVENTIONS:  -  Assess patient's ability to carry out ADLs; assess patient's baseline for ADL function and identify physical deficits which impact ability to perform ADLs (bathing, care of mouth/teeth, toileting, grooming, dressing, etc )  - Assess/evaluate cause of self-care deficits   - Assess range of motion  - Assess patient's mobility; develop plan if impaired  - Assess patient's need for assistive devices and provide as appropriate  - Encourage maximum independence but intervene and supervise when necessary  ¯ Involve family in performance of ADLs  ¯ Assess for home care needs following discharge   ¯ Request OT consult to assist with ADL evaluation and planning for discharge  ¯ Provide patient education as appropriate   Outcome: Progressing    Goal: Maintain or return mobility status to optimal level  INTERVENTIONS:  - Assess patient's baseline mobility status (ambulation, transfers, stairs, etc )    - Identify cognitive and physical deficits and behaviors that affect mobility  - Identify mobility aids required to assist with transfers and/or ambulation (gait belt, sit-to-stand, lift, walker, cane, etc )  - [place] fall precautions as indicated by assessment  - Record patient progress and toleration of activity level on Mobility SBAR; progress patient to next Phase/Stage  - Instruct patient to call for assistance with activity based on assessment  - Request Rehabilitation consult to assist with strengthening/weightbearing, etc    Outcome: Progressing      Problem: DISCHARGE PLANNING  Goal: Discharge to home or other facility with appropriate resources  INTERVENTIONS:  - Identify barriers to discharge w/patient and caregiver  - Arrange for needed discharge resources and transportation as appropriate  - Identify discharge learning needs (meds, wound care, etc )  - Arrange for interpretive services to assist at discharge as needed  - Refer to Case Management Department for coordinating discharge planning if the patient needs post-hospital services based on physician/advanced practitioner order or complex needs related to functional status, cognitive ability, or social support system   Outcome: Progressing

## 2018-01-20 LAB
BASOPHILS # BLD AUTO: 0.02 THOUSANDS/ΜL (ref 0–0.1)
BASOPHILS NFR BLD AUTO: 0 % (ref 0–1)
EOSINOPHIL # BLD AUTO: 0.46 THOUSAND/ΜL (ref 0–0.61)
EOSINOPHIL NFR BLD AUTO: 4 % (ref 0–6)
ERYTHROCYTE [DISTWIDTH] IN BLOOD BY AUTOMATED COUNT: 14.2 % (ref 11.6–15.1)
HCT VFR BLD AUTO: 42.1 % (ref 36.5–49.3)
HGB BLD-MCNC: 14 G/DL (ref 12–17)
LYMPHOCYTES # BLD AUTO: 1.51 THOUSANDS/ΜL (ref 0.6–4.47)
LYMPHOCYTES NFR BLD AUTO: 12 % (ref 14–44)
MCH RBC QN AUTO: 30.2 PG (ref 26.8–34.3)
MCHC RBC AUTO-ENTMCNC: 33.3 G/DL (ref 31.4–37.4)
MCV RBC AUTO: 91 FL (ref 82–98)
MONOCYTES # BLD AUTO: 0.92 THOUSAND/ΜL (ref 0.17–1.22)
MONOCYTES NFR BLD AUTO: 7 % (ref 4–12)
NEUTROPHILS # BLD AUTO: 9.86 THOUSANDS/ΜL (ref 1.85–7.62)
NEUTS SEG NFR BLD AUTO: 77 % (ref 43–75)
NRBC BLD AUTO-RTO: 0 /100 WBCS
PLATELET # BLD AUTO: 241 THOUSANDS/UL (ref 149–390)
PMV BLD AUTO: 9.7 FL (ref 8.9–12.7)
RBC # BLD AUTO: 4.64 MILLION/UL (ref 3.88–5.62)
WBC # BLD AUTO: 12.8 THOUSAND/UL (ref 4.31–10.16)

## 2018-01-20 PROCEDURE — 85025 COMPLETE CBC W/AUTO DIFF WBC: CPT | Performed by: ORTHOPAEDIC SURGERY

## 2018-01-20 RX ADMIN — DOCUSATE SODIUM 100 MG: 100 CAPSULE, LIQUID FILLED ORAL at 08:30

## 2018-01-20 RX ADMIN — ACETAMINOPHEN 650 MG: 325 TABLET, FILM COATED ORAL at 23:36

## 2018-01-20 RX ADMIN — OXYCODONE HYDROCHLORIDE 5 MG: 5 TABLET ORAL at 03:19

## 2018-01-20 RX ADMIN — SENNOSIDES 8.6 MG: 8.6 TABLET, FILM COATED ORAL at 08:32

## 2018-01-20 RX ADMIN — ACETAMINOPHEN 650 MG: 325 TABLET, FILM COATED ORAL at 06:11

## 2018-01-20 RX ADMIN — ACETAMINOPHEN 650 MG: 325 TABLET, FILM COATED ORAL at 17:28

## 2018-01-20 RX ADMIN — METHOCARBAMOL 750 MG: 750 TABLET ORAL at 08:30

## 2018-01-20 RX ADMIN — ACETAMINOPHEN 650 MG: 325 TABLET, FILM COATED ORAL at 11:29

## 2018-01-20 RX ADMIN — PRAVASTATIN SODIUM 20 MG: 20 TABLET ORAL at 08:30

## 2018-01-20 RX ADMIN — TRAMADOL HYDROCHLORIDE 50 MG: 50 TABLET, FILM COATED ORAL at 23:35

## 2018-01-20 RX ADMIN — PANTOPRAZOLE SODIUM 20 MG: 20 TABLET, DELAYED RELEASE ORAL at 17:28

## 2018-01-20 RX ADMIN — FLUOXETINE 10 MG: 10 CAPSULE ORAL at 08:33

## 2018-01-20 RX ADMIN — NICOTINE 1 PATCH: 21 PATCH, EXTENDED RELEASE TRANSDERMAL at 08:33

## 2018-01-20 RX ADMIN — DOCUSATE SODIUM 100 MG: 100 CAPSULE, LIQUID FILLED ORAL at 17:28

## 2018-01-20 RX ADMIN — TRAMADOL HYDROCHLORIDE 50 MG: 50 TABLET, FILM COATED ORAL at 11:31

## 2018-01-20 RX ADMIN — TRAMADOL HYDROCHLORIDE 50 MG: 50 TABLET, FILM COATED ORAL at 17:28

## 2018-01-20 RX ADMIN — CEFAZOLIN SODIUM 2000 MG: 2 SOLUTION INTRAVENOUS at 21:27

## 2018-01-20 RX ADMIN — BUPROPION HYDROCHLORIDE 50 MG: 100 TABLET, FILM COATED ORAL at 08:32

## 2018-01-20 RX ADMIN — METHOCARBAMOL 750 MG: 750 TABLET ORAL at 17:28

## 2018-01-20 RX ADMIN — ENOXAPARIN SODIUM 40 MG: 40 INJECTION SUBCUTANEOUS at 08:31

## 2018-01-20 RX ADMIN — TRAMADOL HYDROCHLORIDE 50 MG: 50 TABLET, FILM COATED ORAL at 06:11

## 2018-01-20 RX ADMIN — CEFAZOLIN SODIUM 2000 MG: 2 SOLUTION INTRAVENOUS at 14:55

## 2018-01-20 RX ADMIN — BUPROPION HYDROCHLORIDE 50 MG: 100 TABLET, FILM COATED ORAL at 21:26

## 2018-01-20 RX ADMIN — PANTOPRAZOLE SODIUM 20 MG: 20 TABLET, DELAYED RELEASE ORAL at 06:11

## 2018-01-20 RX ADMIN — CEFAZOLIN SODIUM 2000 MG: 2 SOLUTION INTRAVENOUS at 08:26

## 2018-01-20 NOTE — PROGRESS NOTES
Orthopedics   Tammie Ibarra  74 y o  male MRN: [id number]  Unit/Bed#: CW3 345-01    HPI:   74 y  o male POD 1 S/P I&D washout L elbow and thenar eminance   Pt  Doing well this morning      Labs:    0  Lab Value Date/Time   HCT 42 1 01/20/2018 0454   HCT 44 3 01/19/2018 0530   HCT 43 9 01/18/2018 2126   HGB 14 0 01/20/2018 0454   HGB 14 8 01/19/2018 0530   HGB 15 0 01/18/2018 2126   WBC 12 80 (H) 01/20/2018 0454   WBC 10 68 (H) 01/19/2018 0530   WBC 10 91 (H) 01/18/2018 2126   ESR 18 (H) 01/18/2018 2126   CRP 30 9 (H) 01/18/2018 2126       Meds:    Current Facility-Administered Medications:     acetaminophen (TYLENOL) tablet 650 mg, 650 mg, Oral, Q6H [address], Georgia Howell MD, 650 mg at 01/20/18 4163    acetaminophen (TYLENOL) tablet 650 mg, 650 mg, Oral, Q6H PRN, Meredith Pantoja MD    buPROPion [facility]) tablet 50 mg, 50 mg, Oral, BID, Meredith Pantoja MD, 50 mg at 01/20/18 0228    docusate sodium (COLACE) capsule 100 mg, 100 mg, Oral, BID, Georgia Howell MD, 100 mg at 01/20/18 0830    enoxaparin (LOVENOX) subcutaneous injection 40 mg, 40 mg, Subcutaneous, Daily, Meredith Pantoja MD, 40 mg at 01/20/18 0831    FLUoxetine (PROzac) capsule 10 mg, 10 mg, Oral, Daily, Meredith Pantoja MD, 10 mg at 01/20/18 0923    influenza inactivated quadrivalent vaccine (FLULAVAL) IM injection 0 5 mL, 0 5 mL, Intramuscular, Prior to discharge, Calixto Solorzano MD    methocarbamol (ROBAXIN) tablet 750 mg, 750 mg, Oral, BID, Meredith Pantoja MD, 750 mg at 01/20/18 0830    morphine injection 2 mg, 2 mg, Intravenous, Q2H PRN, MD Ashu Smith  nicotine (NICODERM CQ) 21 mg/24 hr TD 24 hr patch 1 patch, 1 patch, Transdermal, Daily, Georgia Howell MD, 1 patch at 01/20/18 0833    ondansetron [facility]) injection 4 mg, 4 mg, Intravenous, Q6H PRN, Georgia Howell MD, 4 mg at 01/19/18 2336    oxyCODONE (ROXICODONE) IR tablet 5 mg, 5 mg, Oral, Q4H PRN, Georgia Howell MD, 5 mg at 01/20/18 0319    pantoprazole (PROTONIX) EC tablet 20 mg, 20 mg, Oral, BID ACJuan PA-C, 20 mg at 01/20/18 1810    pravastatin (PRAVACHOL) tablet 20 mg, 20 mg, Oral, Daily, Michelle Brewster MD, 20 mg at 01/20/18 0830    senna (SENOKOT) tablet 8 6 mg, 1 tablet, Oral, Daily, Ping Johnson MD, 8 6 mg at 01/20/18 8048    sodium chloride 0 9 % infusion, 75 mL/hr, Intravenous, Continuous, Michelle Brewster MD, Last Rate: 75 mL/hr at 01/19/18 1732, 75 mL/hr at 01/19/18 1732    traMADol (ULTRAM) tablet 50 mg, 50 mg, Oral, Q6H [address], Michelle Brewster MD, 50 mg at 01/20/18 8250    Blood Culture:   No results found for: BLOODCX    Wound Culture:   No results found for: WOUNDCULT    Ins and Outs:  I/O last 24 hours: In: 1600 [P O :480; I V :900; IV Piggyback:220]  Out: 950 [Urine:950]          Physical Exam:   /66 (BP Location: Right arm)   Pulse 87   Temp 98 1 °F (36 7 °C) (Oral)   Resp 18   Ht 5' 10" (1 778 m)   Wt 98 9 kg (218 lb)   SpO2 97%   BMI 31 28 kg/m²   Gen: Alert and oriented to person, place, time  Abdomen: soft nontender/nondistended  · Musculoskeletal: leftUpper Extremity  · Splint in place  · Erythema present over elbow and thenar eminance  · TTP over abscess and wound of L hand and elbow  · Sensation intact Radial, Ulna and Median  · 5/5 motor to Radial, Ulna and Median    *_*_*_*_*_*_*_*_*_*_*_*_*_*_*_*_*_*_*_*_*_*_*_*_*_*_*_*_*_*_*_*_*_*_*_*_*_*_*_*_*    Assessment:  74 y  o male POD 1 S/P I&D  left hand and elbow and abscess    Plan:   · abx: Per ID  · F/U Wound Cx  · Nonweight bearing to left upper extremity in splint  · Heat to affected area  · Daily betadine soaks TID  · Dispo: Ortho will follow      Niraj Freitas MD

## 2018-01-20 NOTE — SOCIAL WORK
CM met with pt to discuss the role of CM  Pt lives alone in a 2nd floor apartment which has [address]STE  Pt works, drives, and was fully independent PTA  Pt owns no DME or living will  Pt's pharmacy is the [facility]  Pt states no hx of mental health or substance abuse  Pt's friend will transport home

## 2018-01-21 LAB
BACTERIA TISS AEROBE CULT: ABNORMAL
BACTERIA TISS AEROBE CULT: ABNORMAL
BACTERIA WND AEROBE CULT: ABNORMAL
BACTERIA WND AEROBE CULT: ABNORMAL
GRAM STN SPEC: ABNORMAL

## 2018-01-21 RX ADMIN — CEFAZOLIN SODIUM 2000 MG: 2 SOLUTION INTRAVENOUS at 06:14

## 2018-01-21 RX ADMIN — CEFAZOLIN SODIUM 2000 MG: 2 SOLUTION INTRAVENOUS at 14:01

## 2018-01-21 RX ADMIN — PANTOPRAZOLE SODIUM 20 MG: 20 TABLET, DELAYED RELEASE ORAL at 06:13

## 2018-01-21 RX ADMIN — METHOCARBAMOL 750 MG: 750 TABLET ORAL at 17:33

## 2018-01-21 RX ADMIN — ACETAMINOPHEN 650 MG: 325 TABLET, FILM COATED ORAL at 17:33

## 2018-01-21 RX ADMIN — PANTOPRAZOLE SODIUM 20 MG: 20 TABLET, DELAYED RELEASE ORAL at 15:36

## 2018-01-21 RX ADMIN — ACETAMINOPHEN 650 MG: 325 TABLET, FILM COATED ORAL at 23:58

## 2018-01-21 RX ADMIN — VANCOMYCIN HYDROCHLORIDE 1500 MG: 10 INJECTION, POWDER, LYOPHILIZED, FOR SOLUTION INTRAVENOUS at 15:36

## 2018-01-21 RX ADMIN — NICOTINE 1 PATCH: 21 PATCH, EXTENDED RELEASE TRANSDERMAL at 09:12

## 2018-01-21 RX ADMIN — FLUOXETINE 10 MG: 10 CAPSULE ORAL at 09:09

## 2018-01-21 RX ADMIN — BUPROPION HYDROCHLORIDE 50 MG: 100 TABLET, FILM COATED ORAL at 09:09

## 2018-01-21 RX ADMIN — ENOXAPARIN SODIUM 40 MG: 40 INJECTION SUBCUTANEOUS at 09:13

## 2018-01-21 RX ADMIN — PRAVASTATIN SODIUM 20 MG: 20 TABLET ORAL at 09:09

## 2018-01-21 RX ADMIN — ACETAMINOPHEN 650 MG: 325 TABLET, FILM COATED ORAL at 06:13

## 2018-01-21 RX ADMIN — ACETAMINOPHEN 650 MG: 325 TABLET, FILM COATED ORAL at 11:17

## 2018-01-21 RX ADMIN — BUPROPION HYDROCHLORIDE 50 MG: 100 TABLET, FILM COATED ORAL at 17:35

## 2018-01-21 RX ADMIN — TRAMADOL HYDROCHLORIDE 50 MG: 50 TABLET, FILM COATED ORAL at 11:17

## 2018-01-21 RX ADMIN — SENNOSIDES 8.6 MG: 8.6 TABLET, FILM COATED ORAL at 09:08

## 2018-01-21 RX ADMIN — DOCUSATE SODIUM 100 MG: 100 CAPSULE, LIQUID FILLED ORAL at 17:33

## 2018-01-21 RX ADMIN — METHOCARBAMOL 750 MG: 750 TABLET ORAL at 09:08

## 2018-01-21 RX ADMIN — TRAMADOL HYDROCHLORIDE 50 MG: 50 TABLET, FILM COATED ORAL at 06:13

## 2018-01-21 RX ADMIN — TRAMADOL HYDROCHLORIDE 50 MG: 50 TABLET, FILM COATED ORAL at 23:56

## 2018-01-21 RX ADMIN — DOCUSATE SODIUM 100 MG: 100 CAPSULE, LIQUID FILLED ORAL at 09:08

## 2018-01-21 NOTE — PROGRESS NOTES
Orthopedics   Bala Castellon  74 y o  male MRN: [id number]  Unit/Bed#: CW3 345-01    HPI:   74 y  o male POD 2 S/P I&D washout L elbow and thenar eminance   Pain improving      Labs:    0  Lab Value Date/Time   HCT 42 1 01/20/2018 0454   HCT 44 3 01/19/2018 0530   HCT 43 9 01/18/2018 2126   HGB 14 0 01/20/2018 0454   HGB 14 8 01/19/2018 0530   HGB 15 0 01/18/2018 2126   WBC 12 80 (H) 01/20/2018 0454   WBC 10 68 (H) 01/19/2018 0530   WBC 10 91 (H) 01/18/2018 2126   ESR 18 (H) 01/18/2018 2126   CRP 30 9 (H) 01/18/2018 2126       Meds:    Current Facility-Administered Medications:     acetaminophen (TYLENOL) tablet 650 mg, 650 mg, Oral, Q6H [address], Nicole Ratliff MD, 650 mg at 01/21/18 5842    acetaminophen (TYLENOL) tablet 650 mg, 650 mg, Oral, Q6H PRN, William Pelayo MD    [facility]) tablet 50 mg, 50 mg, Oral, BID, William Pelayo MD, 50 mg at 01/20/18 2126    ceFAZolin (ANCEF) IVPB (premix) 2,000 mg, 2,000 mg, Intravenous, Q8H [address], Kelsey Raymundo MD, Last Rate: 100 mL/hr at 01/21/18 0614, 2,000 mg at 01/21/18 0244    docusate sodium (COLACE) capsule 100 mg, 100 mg, Oral, BID, Nicole Ratliff MD, 100 mg at 01/20/18 1728    enoxaparin (LOVENOX) subcutaneous injection 40 mg, 40 mg, Subcutaneous, Daily, William Pelayo MD, 40 mg at 01/20/18 0831    FLUoxetine (PROzac) capsule 10 mg, 10 mg, Oral, Daily, William Pelayo MD, 10 mg at 01/20/18 1019    influenza inactivated quadrivalent vaccine (FLULAVAL) IM injection 0 5 mL, 0 5 mL, Intramuscular, Prior to discharge, Levorn Settles, MD    methocarbamol (ROBAXIN) tablet 750 mg, 750 mg, Oral, BID, William Pelayo MD, 750 mg at 01/20/18 1728    morphine injection 2 mg, 2 mg, Intravenous, Q2H PRN, MD Colten Marcelo [facility]  nicotine (NICODERM CQ) 21 mg/24 hr TD 24 hr patch 1 patch, 1 patch, Transdermal, Daily, Nicole Ratliff MD, 1 patch at 01/20/18 0833    ondansetron [facility]) injection 4 mg, 4 mg, Intravenous, Q6H PRN, Nicole Ratliff MD, 4 mg at 01/19/18 2336    oxyCODONE (ROXICODONE) IR tablet 5 mg, 5 mg, Oral, Q4H PRN, Celeste Villanueva MD, 5 mg at 01/20/18 0319    pantoprazole (PROTONIX) EC tablet 20 mg, 20 mg, Oral, BID AC, Sherry Baker, PA-C, 20 mg at 01/21/18 0590    pravastatin (PRAVACHOL) tablet 20 mg, 20 mg, Oral, Daily, Tuan Parekh MD, 20 mg at 01/20/18 0830    senna (SENOKOT) tablet 8 6 mg, 1 tablet, Oral, Daily, Celeste Villanueva MD, 8 6 mg at 01/20/18 4106    sodium chloride 0 9 % infusion, 75 mL/hr, Intravenous, Continuous, Tuan Parekh MD, Last Rate: 75 mL/hr at 01/19/18 1732, 75 mL/hr at 01/19/18 1732    traMADol (ULTRAM) tablet 50 mg, 50 mg, Oral, Q6H [address], Tuan Parekh MD, 50 mg at 01/21/18 6932    Blood Culture:   No results found for: BLOODCX    Wound Culture:   Lab Results   Component Value Date    WOUNDCULT 3+ Growth of Staphylococcus aureus (A) 01/18/2018    WOUNDCULT 3+ Growth of Staphylococcus aureus (A) 01/18/2018       Ins and Outs:  I/O last 24 hours: In: 18 [P O :860]  Out: 1750 [Urine:1750]          Physical Exam:   /76 (BP Location: Right arm)   Pulse 78   Temp 98 6 °F (37 °C) (Oral)   Resp 18   Ht 5' 10" (1 778 m)   Wt 98 9 kg (218 lb)   SpO2 97%   BMI 31 28 kg/m²   Gen: Alert and oriented to person, place, time  Abdomen: soft nontender/nondistended  · Musculoskeletal: leftUpper Extremity  · Hand: small amount of skin maceration, no fluid collection appreciated  · Elbow: small amount of skin maceration, no obvious drainage  · Minimal Erythema present over elbow and thenar eminance  · Sensation intact Radial, Ulna and Median  · 5/5 motor to Radial, Ulna and Median    *_*_*_*_*_*_*_*_*_*_*_*_*_*_*_*_*_*_*_*_*_*_*_*_*_*_*_*_*_*_*_*_*_*_*_*_*_*_*_*_*    Assessment:  74 y  o male POD 2 S/P I&D  left hand and elbow and abscess    Plan:   · abx: Per ID  · F/U Wound Cx  · Nonweight bearing to left upper extremity in splint  · Heat to affected area  · Daily betadine soaks TID  · Dispo: Ortho will follow      Giorgi Sharma Yesi Mahoney MD

## 2018-01-21 NOTE — PLAN OF CARE
DISCHARGE PLANNING     Discharge to home or other facility with appropriate resources Progressing        INFECTION - ADULT     Absence or prevention of progression during hospitalization Progressing     Absence of fever/infection during neutropenic period Progressing        Knowledge Deficit     Patient/family/caregiver demonstrates understanding of disease process, treatment plan, medications, and discharge instructions Progressing        MUSCULOSKELETAL - ADULT     Maintain or return mobility to safest level of function Progressing     Maintain proper alignment of affected body part Progressing        PAIN - ADULT     Verbalizes/displays adequate comfort level or baseline comfort level Progressing        SAFETY ADULT     Patient will remain free of falls Progressing     Maintain or return to baseline ADL function Progressing     Maintain or return mobility status to optimal level Progressing        SKIN/TISSUE INTEGRITY - ADULT     Skin integrity remains intact Progressing     Incision(s), wounds(s) or drain site(s) healing without S/S of infection Progressing     Oral mucous membranes remain intact Progressing

## 2018-01-21 NOTE — PROGRESS NOTES
Progress Note - Infectious Disease   Mariana Irby  74 y o  male MRN: [id number]  Unit/Bed#: CW3 345-01 Encounter: [id number]      Impression/Recommendations:  1  MRSA in left hand abscess, secondary to traumatic laceration  The patient is status post I&D  No evidence of osteomyelitis on x-ray  Patient is clinically and systemically well, without sepsis or systemic toxicity  Although patient is clinically improved on IV cefazolin, will change antibiotic for MRSA  Patient will unlikely need long-term IV antibiotic  Change antibiotic to IV vancomycin  Anticipate transition to p  o  Bactrim in a few days  Serial hand exams  Further need for I&D per Orthopedics Service      2  MRSA left elbow abscess  I suspect this is secondary to left olecranon bursitis  He is status post I&D  No evidence of osteomyelitis on x-ray  Patient will unlikely need long-term IV antibiotic  Antibiotic plan as in above  Serial exams  Further need for I&D per Orthopedics Service      Discussed with patient in detail regarding the above plan  Antibiotics:  Cefazolin  POD # 2    Subjective:  Patient feels better  Left hand and left elbow pain improved  Temperature stays down  No chills  He is tolerating antibiotic well  No nausea, vomiting or diarrhea  Objective:  Vitals:  HR:  [69-82] 72  Resp:  [18] 18  BP: (124-147)/(62-77) 134/77  SpO2:  [96 %-98 %] 97 %  Temp (24hrs), Av 2 °F (36 8 °C), Min:97 8 °F (36 6 °C), Max:98 6 °F (37 °C)  Current: Temperature: 97 9 °F (36 6 °C)    Physical Exam:     General: Awake, alert, cooperative, no distress  Lungs: Expansion symmetric, no rales, no wheezing, respirations unlabored  Heart[de-identified]  Regular rate and rhythm, S1 and S2 normal, no murmur  Abdomen: Soft, nondistended, non-tender, bowel sounds active all four quadrants,        no masses, no organomegaly  Extremities: Left hand and elbow with dressing in place  Dressing is dry  Decreased edema    Decreased erythema  Improved tenderness  Skin:  No rash  Invasive Devices     Peripheral Intravenous Line            Peripheral IV 01/18/18 Right Forearm 2 days                Labs studies:   I have personally reviewed pertinent labs  Results from last 7 days  Lab Units 01/19/18  0530 01/18/18  2126   SODIUM mmol/L 136 138   POTASSIUM mmol/L 4 0 4 0   CHLORIDE mmol/L 102 102   CO2 mmol/L 28 29   ANION GAP mmol/L 6 7   BUN mg/dL 14 14   CREATININE mg/dL 0 67 0 67   EGFR ml/min/1 73sq m 101 101   GLUCOSE RANDOM mg/dL 88 91   CALCIUM mg/dL 8 6 8 5       Results from last 7 days  Lab Units 01/20/18  0454 01/19/18  0530 01/18/18  2126   WBC Thousand/uL 12 80* 10 68* 10 91*   HEMOGLOBIN g/dL 14 0 14 8 15 0   PLATELETS Thousands/uL 241 262 260       Results from last 7 days  Lab Units 01/19/18  1605 01/19/18  1601 01/18/18  2331   GRAM STAIN RESULT  2+ Polys  Rare Gram positive cocci in pairs 2+ Polys  1+ Gram positive cocci in pairs No polys seen  1+ Gram positive cocci in pairs  No polys seen  1+ Gram positive cocci in pairs   WOUND CULTURE   --   --  3+ Growth of Methicillin Resistant Staphylococcus aureus*  3+ Growth of Methicillin Resistant Staphylococcus aureus*       Imaging Studies:   I have personally reviewed pertinent imaging study reports and images in PACS  EKG, Pathology, and Other Studies:   I have personally reviewed pertinent reports

## 2018-01-22 LAB
BACTERIA SPEC ANAEROBE CULT: NORMAL
BACTERIA SPEC ANAEROBE CULT: NORMAL

## 2018-01-22 RX ADMIN — VANCOMYCIN HYDROCHLORIDE 1500 MG: 10 INJECTION, POWDER, LYOPHILIZED, FOR SOLUTION INTRAVENOUS at 14:30

## 2018-01-22 RX ADMIN — METHOCARBAMOL 750 MG: 750 TABLET ORAL at 08:11

## 2018-01-22 RX ADMIN — NICOTINE 1 PATCH: 21 PATCH, EXTENDED RELEASE TRANSDERMAL at 08:13

## 2018-01-22 RX ADMIN — FLUOXETINE 10 MG: 10 CAPSULE ORAL at 08:13

## 2018-01-22 RX ADMIN — BUPROPION HYDROCHLORIDE 50 MG: 100 TABLET, FILM COATED ORAL at 08:13

## 2018-01-22 RX ADMIN — DOCUSATE SODIUM 100 MG: 100 CAPSULE, LIQUID FILLED ORAL at 08:14

## 2018-01-22 RX ADMIN — VANCOMYCIN HYDROCHLORIDE 1500 MG: 10 INJECTION, POWDER, LYOPHILIZED, FOR SOLUTION INTRAVENOUS at 03:11

## 2018-01-22 RX ADMIN — PANTOPRAZOLE SODIUM 20 MG: 20 TABLET, DELAYED RELEASE ORAL at 17:35

## 2018-01-22 RX ADMIN — ACETAMINOPHEN 650 MG: 325 TABLET, FILM COATED ORAL at 17:35

## 2018-01-22 RX ADMIN — PANTOPRAZOLE SODIUM 20 MG: 20 TABLET, DELAYED RELEASE ORAL at 06:11

## 2018-01-22 RX ADMIN — PRAVASTATIN SODIUM 20 MG: 20 TABLET ORAL at 08:11

## 2018-01-22 RX ADMIN — ENOXAPARIN SODIUM 40 MG: 40 INJECTION SUBCUTANEOUS at 08:11

## 2018-01-22 RX ADMIN — ACETAMINOPHEN 650 MG: 325 TABLET, FILM COATED ORAL at 06:12

## 2018-01-22 RX ADMIN — TRAMADOL HYDROCHLORIDE 50 MG: 50 TABLET, FILM COATED ORAL at 13:09

## 2018-01-22 RX ADMIN — BUPROPION HYDROCHLORIDE 50 MG: 100 TABLET, FILM COATED ORAL at 17:36

## 2018-01-22 RX ADMIN — ACETAMINOPHEN 650 MG: 325 TABLET, FILM COATED ORAL at 13:09

## 2018-01-22 RX ADMIN — DOCUSATE SODIUM 100 MG: 100 CAPSULE, LIQUID FILLED ORAL at 17:35

## 2018-01-22 RX ADMIN — TRAMADOL HYDROCHLORIDE 50 MG: 50 TABLET, FILM COATED ORAL at 17:35

## 2018-01-22 RX ADMIN — SENNOSIDES 8.6 MG: 8.6 TABLET, FILM COATED ORAL at 08:10

## 2018-01-22 RX ADMIN — METHOCARBAMOL 750 MG: 750 TABLET ORAL at 17:35

## 2018-01-22 NOTE — PROGRESS NOTES
Progress Note - Infectious Disease   Best Adan  74 y o  male MRN: [id number]  Unit/Bed#: CW3 345-01 Encounter: [id number]      Impression/Recommendations:  1    MRSA in left hand abscess, secondary to traumatic laceration   The patient is status post I&D   No evidence of osteomyelitis on x-ray   Patient is clinically and systemically well, without sepsis or systemic toxicity  Although patient is clinically improved on IV cefazolin, will change antibiotic for MRSA   Patient will unlikely need long-term IV antibiotic  Continue IV vancomycin  Anticipate transition to high-dose p  o  Bactrim in the next 24 hours  Serial hand exams  Treat x 14 days total, another 12 days      2    MRSA left elbow abscess   I suspect this is secondary to left olecranon bursitis   He is status post I&D   No evidence of osteomyelitis on x-ray   Patient will unlikely need long-term IV antibiotic  Antibiotic plan as in above  Serial exams        Discussed with patient in detail regarding the above plan      Antibiotics:  Vancomycin # 2  POD # 3     Subjective:  Left hand and elbow pain continues to improve  Temperature stays down  No chills  He is tolerating antibiotic well  No nausea, vomiting or diarrhea  Objective:  Vitals:  HR:  [69-74] 74  Resp:  [18-20] 18  BP: (133-137)/(69-77) 133/69  SpO2:  [96 %-100 %] 100 %  Temp (24hrs), Av 9 °F (36 6 °C), Min:97 5 °F (36 4 °C), Max:98 2 °F (36 8 °C)  Current: Temperature: 97 5 °F (36 4 °C)    Physical Exam:     General: Awake, alert, cooperative, no distress  Lungs: Expansion symmetric, no rales, no wheezing, respirations unlabored  Heart[de-identified]  Regular rate and rhythm, S1 and S2 normal, no murmur  Abdomen: Soft, nondistended, non-tender, bowel sounds active all four quadrants,        no masses, no organomegaly  Extremities: Left hand and elbow with dressing in place  Dressing is dry  No erythema outside of dressing  Decreased edema  Mild tenderness in elbow  ROM is good  Skin:  No rash  Invasive Devices     Peripheral Intravenous Line            Peripheral IV 01/22/18 Right Forearm less than 1 day                Labs studies:   I have personally reviewed pertinent labs  Results from last 7 days  Lab Units 01/19/18  0530 01/18/18  2126   SODIUM mmol/L 136 138   POTASSIUM mmol/L 4 0 4 0   CHLORIDE mmol/L 102 102   CO2 mmol/L 28 29   ANION GAP mmol/L 6 7   BUN mg/dL 14 14   CREATININE mg/dL 0 67 0 67   EGFR ml/min/1 73sq m 101 101   GLUCOSE RANDOM mg/dL 88 91   CALCIUM mg/dL 8 6 8 5       Results from last 7 days  Lab Units 01/20/18  0454 01/19/18  0530 01/18/18  2126   WBC Thousand/uL 12 80* 10 68* 10 91*   HEMOGLOBIN g/dL 14 0 14 8 15 0   PLATELETS Thousands/uL 241 262 260       Results from last 7 days  Lab Units 01/19/18  1605 01/19/18  1601 01/18/18  2331   GRAM STAIN RESULT  2+ Polys  Rare Gram positive cocci in pairs 2+ Polys  1+ Gram positive cocci in pairs No polys seen  1+ Gram positive cocci in pairs  No polys seen  1+ Gram positive cocci in pairs   WOUND CULTURE   --   --  3+ Growth of Methicillin Resistant Staphylococcus aureus*  3+ Growth of Methicillin Resistant Staphylococcus aureus*       Imaging Studies:   I have personally reviewed pertinent imaging study reports and images in PACS  EKG, Pathology, and Other Studies:   I have personally reviewed pertinent reports

## 2018-01-22 NOTE — PROGRESS NOTES
Orthopedics   Craig Pan  74 y o  male MRN: [id number]  Unit/Bed#: CW3 345-01    HPI:   74 y  o male POD 3 S/P I&D washout L elbow and thenar eminance   No O/N events      Labs:    0  Lab Value Date/Time   HCT 42 1 01/20/2018 0454   HCT 44 3 01/19/2018 0530   HCT 43 9 01/18/2018 2126   HGB 14 0 01/20/2018 0454   HGB 14 8 01/19/2018 0530   HGB 15 0 01/18/2018 2126   WBC 12 80 (H) 01/20/2018 0454   WBC 10 68 (H) 01/19/2018 0530   WBC 10 91 (H) 01/18/2018 2126   ESR 18 (H) 01/18/2018 2126   CRP 30 9 (H) 01/18/2018 2126       Meds:    Current Facility-Administered Medications:     acetaminophen (TYLENOL) tablet 650 mg, 650 mg, Oral, Q6H [address], Celeste Villanueva MD, 650 mg at 01/22/18 0612    acetaminophen (TYLENOL) tablet 650 mg, 650 mg, Oral, Q6H PRN, Tuan Parekh MD    buPROPion [facility]) tablet 50 mg, 50 mg, Oral, BID, Tuan Parekh MD, 50 mg at 01/21/18 1735    docusate sodium (COLACE) capsule 100 mg, 100 mg, Oral, BID, Celeste Villanueva MD, 100 mg at 01/21/18 1733    enoxaparin (LOVENOX) subcutaneous injection 40 mg, 40 mg, Subcutaneous, Daily, Tuan Parekh MD, 40 mg at 01/21/18 0913    FLUoxetine (PROzac) capsule 10 mg, 10 mg, Oral, Daily, Tuan Parekh MD, 10 mg at 01/21/18 0909    influenza inactivated quadrivalent vaccine (FLULAVAL) IM injection 0 5 mL, 0 5 mL, Intramuscular, Prior to discharge, Jm Bernard MD    methocarbamol (ROBAXIN) tablet 750 mg, 750 mg, Oral, BID, Tuan Parekh MD, 750 mg at 01/21/18 1733    morphine injection 2 mg, 2 mg, Intravenous, Q2H PRN, Celeste Villanueva MD  Earna Elaine  nicotine (NICODERM CQ) 21 mg/24 hr TD 24 hr patch 1 patch, 1 patch, Transdermal, Daily, Celeste Villanueva MD, 1 patch at 01/21/18 0912    ondansetron [facility]) injection 4 mg, 4 mg, Intravenous, Q6H PRN, Celeste Villanueva MD, 4 mg at 01/19/18 2336    oxyCODONE (ROXICODONE) IR tablet 5 mg, 5 mg, Oral, Q4H PRN, Celeste Villanueva MD, 5 mg at 01/20/18 0319    pantoprazole (PROTONIX) EC tablet 20 mg, 20 mg, Oral, BID AC, Brandon Llamas PA-C, 20 mg at 01/22/18 1136    pravastatin (PRAVACHOL) tablet 20 mg, 20 mg, Oral, Daily, Ariel Dutta MD, 20 mg at 01/21/18 0909    senna (SENOKOT) tablet 8 6 mg, 1 tablet, Oral, Daily, Maira Brito MD, 8 6 mg at 01/21/18 0908    sodium chloride 0 9 % infusion, 75 mL/hr, Intravenous, Continuous, Ariel Dutta MD, Last Rate: 75 mL/hr at 01/19/18 1732, 75 mL/hr at 01/19/18 1732    traMADol (ULTRAM) tablet 50 mg, 50 mg, Oral, Q6H [address], Ariel Dutta MD, 50 mg at 01/21/18 2356    vancomycin (VANCOCIN) 1,500 mg in sodium chloride 0 9 % 250 mL IVPB, 15 mg/kg, Intravenous, Q12H, Belen Sparks MD, Last Rate: 166 7 mL/hr at 01/22/18 0311, 1,500 mg at 01/22/18 3406    Blood Culture:   No results found for: BLOODCX    Wound Culture:   Lab Results   Component Value Date    WOUNDCULT (A) 01/18/2018     3+ Growth of Methicillin Resistant Staphylococcus aureus    WOUNDCULT (A) 01/18/2018     3+ Growth of Methicillin Resistant Staphylococcus aureus       Ins and Outs:  I/O last 24 hours: In: 720 1 [P O :420; IV Piggyback:300 1]  Out: 300 [Urine:300]          Physical Exam:   /77 (BP Location: Right arm)   Pulse 69   Temp 98 2 °F (36 8 °C) (Oral)   Resp 20   Ht 5' 10" (1 778 m)   Wt 98 9 kg (218 lb)   SpO2 96%   BMI 31 28 kg/m²   Gen: Alert and oriented to person, place, time  Abdomen: soft nontender/nondistended  · Musculoskeletal: leftUpper Extremity  · Hand: NO fluid appreciated  Well closed incision  · Elbow: Packing in place  No purulence appreciated  · Minimal Erythema present over elbow and thenar eminance  · Sensation intact Radial, Ulna and Median  · 5/5 motor to Radial, Ulna and Median    *_*_*_*_*_*_*_*_*_*_*_*_*_*_*_*_*_*_*_*_*_*_*_*_*_*_*_*_*_*_*_*_*_*_*_*_*_*_*_*_*    Assessment:  74 y  o male POD 3 S/P I&D  left hand and elbow and abscess    Plan:   · abx: Per ID  · Nonweight bearing to left upper extremity in splint  · Heat to affected area: aqua K pad  · Daily betadine soaks TID  · Dispo: Ortho will follow      Milli Edwards MD

## 2018-01-22 NOTE — CASE MANAGEMENT
Continued Stay Review    Date: 1/22    Vital Signs: /69 (BP Location: Right arm)   Pulse 74   Temp 97 5 °F (36 4 °C) (Oral)   Resp 18   Ht 5' 10" (1 778 m)   Wt 98 9 kg (218 lb)   SpO2 100%   BMI 31 28 kg/m²     Medications:   Scheduled Meds:   acetaminophen 650 mg Oral Q6H [facility]   buPROPion 50 mg Oral BID   docusate sodium 100 mg Oral BID   enoxaparin 40 mg Subcutaneous Daily   FLUoxetine 10 mg Oral Daily   methocarbamol 750 mg Oral BID   nicotine 1 patch Transdermal Daily   pantoprazole 20 mg Oral BID AC   pravastatin 20 mg Oral Daily   senna 1 tablet Oral Daily   traMADol 50 mg Oral Q6H [facility]   vancomycin 15 mg/kg Intravenous Q12H     Continuous Infusions:   sodium chloride 75 mL/hr Last Rate: 75 mL/hr (01/19/18 3919)     PRN Meds: acetaminophen    influenza vaccine    morphine injection    ondansetron    oxyCODONE    Abnormal Labs/Diagnostic Results:   No new    Age/Sex: 74 y o  male       Assessment:  74 y  o male POD 3 S/P I&D  left hand and elbow and abscess     Plan:   · abx: Per ID  · Nonweight bearing to left upper extremity in splint  · Heat to affected area: aqua K pad  · Daily betadine soaks TID  · Dispo: Ortho will follow      -----------------------------------------------------------------------------------------------------------------    1/22 Infectious Disease progress notes:  Impression/Recommendations: 1    MRSA in left hand abscess, secondary to traumatic laceration   The patient is status post I&D   No evidence of osteomyelitis on x-ray   Patient is clinically and systemically well, without sepsis or systemic toxicity   Although patient is clinically improved on IV cefazolin, will change antibiotic for MRSA   Patient will unlikely need long-term IV antibiotic  Continue IV vancomycin  Anticipate transition to high-dose p  o  Bactrim in the next 24 hours  Serial hand exams    Treat x 14 days total, another 12 days      2    MRSA left elbow abscess   I suspect this is secondary to left olecranon bursitis   He is status post I&D   No evidence of osteomyelitis on x-ray   Patient will unlikely need long-term IV antibiotic  Antibiotic plan as in above    Serial exams           Antibiotics:  Vancomycin # 2  POD # 3      Discharge Plan: Home when medically cleared

## 2018-01-23 VITALS
HEART RATE: 88 BPM | TEMPERATURE: 97.7 F | WEIGHT: 218 LBS | DIASTOLIC BLOOD PRESSURE: 90 MMHG | HEIGHT: 70 IN | OXYGEN SATURATION: 95 % | SYSTOLIC BLOOD PRESSURE: 173 MMHG | RESPIRATION RATE: 18 BRPM | BODY MASS INDEX: 31.21 KG/M2

## 2018-01-23 RX ORDER — SULFAMETHOXAZOLE AND TRIMETHOPRIM 800; 160 MG/1; MG/1
2 TABLET ORAL EVERY 12 HOURS SCHEDULED
Qty: 44 TABLET | Refills: 0 | Status: SHIPPED | OUTPATIENT
Start: 2018-01-23 | End: 2018-02-03

## 2018-01-23 RX ORDER — OXYCODONE HYDROCHLORIDE 5 MG/1
5 TABLET ORAL EVERY 4 HOURS PRN
Qty: 30 TABLET | Refills: 0
Start: 2018-01-23 | End: 2018-02-02

## 2018-01-23 RX ORDER — SULFAMETHOXAZOLE AND TRIMETHOPRIM 800; 160 MG/1; MG/1
2 TABLET ORAL EVERY 12 HOURS SCHEDULED
Status: DISCONTINUED | OUTPATIENT
Start: 2018-01-23 | End: 2018-01-23 | Stop reason: HOSPADM

## 2018-01-23 RX ORDER — DOCUSATE SODIUM 100 MG/1
100 CAPSULE, LIQUID FILLED ORAL 2 TIMES DAILY
Qty: 20 CAPSULE | Refills: 0 | Status: SHIPPED | OUTPATIENT
Start: 2018-01-23 | End: 2020-03-24 | Stop reason: HOSPADM

## 2018-01-23 RX ORDER — ACETAMINOPHEN 325 MG/1
TABLET ORAL
Qty: 30 TABLET | Refills: 0 | Status: SHIPPED | OUTPATIENT
Start: 2018-01-23 | End: 2018-03-28 | Stop reason: ALTCHOICE

## 2018-01-23 RX ADMIN — ACETAMINOPHEN 650 MG: 325 TABLET, FILM COATED ORAL at 06:06

## 2018-01-23 RX ADMIN — SULFAMETHOXAZOLE AND TRIMETHOPRIM 2 TABLET: 800; 160 TABLET ORAL at 11:42

## 2018-01-23 RX ADMIN — NICOTINE 1 PATCH: 21 PATCH, EXTENDED RELEASE TRANSDERMAL at 09:32

## 2018-01-23 RX ADMIN — METHOCARBAMOL 750 MG: 750 TABLET ORAL at 09:30

## 2018-01-23 RX ADMIN — TRAMADOL HYDROCHLORIDE 50 MG: 50 TABLET, FILM COATED ORAL at 06:06

## 2018-01-23 RX ADMIN — TRAMADOL HYDROCHLORIDE 50 MG: 50 TABLET, FILM COATED ORAL at 00:26

## 2018-01-23 RX ADMIN — PANTOPRAZOLE SODIUM 20 MG: 20 TABLET, DELAYED RELEASE ORAL at 06:06

## 2018-01-23 RX ADMIN — ACETAMINOPHEN 650 MG: 325 TABLET, FILM COATED ORAL at 00:26

## 2018-01-23 RX ADMIN — ACETAMINOPHEN 650 MG: 325 TABLET, FILM COATED ORAL at 11:41

## 2018-01-23 RX ADMIN — DOCUSATE SODIUM 100 MG: 100 CAPSULE, LIQUID FILLED ORAL at 09:30

## 2018-01-23 RX ADMIN — VANCOMYCIN HYDROCHLORIDE 1500 MG: 10 INJECTION, POWDER, LYOPHILIZED, FOR SOLUTION INTRAVENOUS at 02:54

## 2018-01-23 RX ADMIN — PRAVASTATIN SODIUM 20 MG: 20 TABLET ORAL at 09:30

## 2018-01-23 RX ADMIN — TRAMADOL HYDROCHLORIDE 50 MG: 50 TABLET, FILM COATED ORAL at 11:41

## 2018-01-23 RX ADMIN — ENOXAPARIN SODIUM 40 MG: 40 INJECTION SUBCUTANEOUS at 09:29

## 2018-01-23 RX ADMIN — FLUOXETINE 10 MG: 10 CAPSULE ORAL at 09:30

## 2018-01-23 RX ADMIN — BUPROPION HYDROCHLORIDE 50 MG: 100 TABLET, FILM COATED ORAL at 09:30

## 2018-01-23 RX ADMIN — SENNOSIDES 8.6 MG: 8.6 TABLET, FILM COATED ORAL at 09:30

## 2018-01-23 NOTE — PROGRESS NOTES
Orthopedics   Mayda Helms  74 y o  male MRN: [id number]  Unit/Bed#: CW3 345-01    HPI:   74 y  o male POD 4 S/P I&D washout L elbow and thenar eminance  pAIN WELL controlled today        Labs:    0  Lab Value Date/Time   HCT 42 1 01/20/2018 0454   HCT 44 3 01/19/2018 0530   HCT 43 9 01/18/2018 2126   HGB 14 0 01/20/2018 0454   HGB 14 8 01/19/2018 0530   HGB 15 0 01/18/2018 2126   WBC 12 80 (H) 01/20/2018 0454   WBC 10 68 (H) 01/19/2018 0530   WBC 10 91 (H) 01/18/2018 2126   ESR 18 (H) 01/18/2018 2126   CRP 30 9 (H) 01/18/2018 2126       Meds:    Current Facility-Administered Medications:     acetaminophen (TYLENOL) tablet 650 mg, 650 mg, Oral, Q6H [address], Rachel Lacey MD, 650 mg at 01/23/18 0606    acetaminophen (TYLENOL) tablet 650 mg, 650 mg, Oral, Q6H PRN, Nikky Ken MD    [facility]) tablet 50 mg, 50 mg, Oral, BID, Nikky Ken MD, 50 mg at 01/22/18 1736    docusate sodium (COLACE) capsule 100 mg, 100 mg, Oral, BID, Rachel Lacey MD, 100 mg at 01/22/18 1735    enoxaparin (LOVENOX) subcutaneous injection 40 mg, 40 mg, Subcutaneous, Daily, Nikky Ken MD, 40 mg at 01/22/18 9828    FLUoxetine (PROzac) capsule 10 mg, 10 mg, Oral, Daily, Nikky Ken MD, 10 mg at 01/22/18 0813    influenza inactivated quadrivalent vaccine (FLULAVAL) IM injection 0 5 mL, 0 5 mL, Intramuscular, Prior to discharge, Josiane Grissom MD    methocarbamol (ROBAXIN) tablet 750 mg, 750 mg, Oral, BID, Nikky Ken MD, 750 mg at 01/22/18 1735    morphine injection 2 mg, 2 mg, Intravenous, Q2H PRN, MD Bubba Stringer  nicotine (NICODERM CQ) 21 mg/24 hr TD 24 hr patch 1 patch, 1 patch, Transdermal, Daily, Rachel Lacey MD, 1 patch at 01/22/18 0813    ondansetron [facility]) injection 4 mg, 4 mg, Intravenous, Q6H PRN, Rachel Lacey MD, 4 mg at 01/19/18 0136    oxyCODONE (ROXICODONE) IR tablet 5 mg, 5 mg, Oral, Q4H PRN, Rachel Lacey MD, 5 mg at 01/20/18 0319    pantoprazole (PROTONIX) EC tablet 20 mg, 20 mg, Oral, BID AC, Stanley Emanuel PA-C, 20 mg at 01/23/18 0606    pravastatin (PRAVACHOL) tablet 20 mg, 20 mg, Oral, Daily, Heike Rodriguez MD, 20 mg at 01/22/18 6023    senna (SENOKOT) tablet 8 6 mg, 1 tablet, Oral, Daily, Colten Burks MD, 8 6 mg at 01/22/18 0810    sodium chloride 0 9 % infusion, 75 mL/hr, Intravenous, Continuous, Heike Rodriguez MD, Last Rate: 75 mL/hr at 01/19/18 1732, 75 mL/hr at 01/19/18 1732    traMADol (ULTRAM) tablet 50 mg, 50 mg, Oral, Q6H [address], Heike Rodriguez MD, 50 mg at 01/23/18 0606    vancomycin (VANCOCIN) 1,500 mg in sodium chloride 0 9 % 250 mL IVPB, 15 mg/kg, Intravenous, Q12H, Harsha Scott MD, Last Rate: 166 7 mL/hr at 01/23/18 0254, 1,500 mg at 01/23/18 0254    Blood Culture:   No results found for: BLOODCX    Wound Culture:   Lab Results   Component Value Date    WOUNDCULT (A) 01/18/2018     3+ Growth of Methicillin Resistant Staphylococcus aureus    WOUNDCULT (A) 01/18/2018     3+ Growth of Methicillin Resistant Staphylococcus aureus       Ins and Outs:  I/O last 24 hours: In: -   Out: 1200 [Urine:1200]          Physical Exam:   /90 (BP Location: Right arm)   Pulse 60   Temp 98 1 °F (36 7 °C) (Oral)   Resp 17   Ht 5' 10" (1 778 m)   Wt 98 9 kg (218 lb)   SpO2 98%   BMI 31 28 kg/m²   Gen: Alert and oriented to person, place, time  Abdomen: soft nontender/nondistended  · Musculoskeletal: leftUpper Extremity  · Hand: NO drainage  Well closed incision  · Elbow: Packing in place  No drainage  · noErythema present over elbow and thenar eminance  · Sensation intact Radial, Ulna and Median  · 5/5 motor to Radial, Ulna and Median    *_*_*_*_*_*_*_*_*_*_*_*_*_*_*_*_*_*_*_*_*_*_*_*_*_*_*_*_*_*_*_*_*_*_*_*_*_*_*_*_*    Assessment:  74 y  o male POD 4 S/P I&D  left hand and elbow and abscess    Plan:   · abx: Per ID  · Nonweight bearing to left upper extremity in splint  · Heat to affected area: aqua K pad  · Daily betadine soaks TID  · Dispo: Ortho will follow      Breanna Umanzor MD

## 2018-01-23 NOTE — DISCHARGE INSTRUCTIONS
Discharge Instructions - Orthopedics  Sanjay Choudhury  74 y o  male MRN: [id number]  Unit/Bed#: [place] [address]-    Weight Bearing Status:                                           Non weight bearing left upper extremity    Antibiotics:  Please complete antibiotic regimen as directed  Pain:  Continue analgesics as directed    Dressing Instructions:   Home nursing will assist with daily dressing changes  You should continue with 3x daily betadine soaks with a mixture of 50% normal saline and 50% betadine  Elbow packing should be changed daily to as to keep the wound edges open and allow for drainage     PT/OT:  Continue PT/OT on outpatient basis as directed    Appt Instructions: If you do not have your appointment, please call the clinic at [phone] to f/u with Dr Gus Rivero in 1 week  Otherwise followup as scheduled below:      Contact the office sooner if you experience any increased numbness/tingling in the extremities

## 2018-01-23 NOTE — PROGRESS NOTES
Progress Note - Infectious Disease   Tammie Ibarra  74 y o  male MRN: [id number]  Unit/Bed#: CW3 345-01 Encounter: [id number]      Impression/Recommendations: 1    MRSA in left hand abscess, secondary to traumatic laceration   The patient is status post I&D   No evidence of osteomyelitis on x-ray   Patient is clinically and systemically well, without sepsis or systemic toxicity   Although patient is clinically improved on IV cefazolin, will change antibiotic for MRSA   Patient will unlikely need long-term IV antibiotic  Change to high-dose p o  Bactrim (2 ds b i d )  Serial hand exams  Treat x 14 days total, another 11 days      2    MRSA left elbow abscess   I suspect this is secondary to left olecranon bursitis   He is status post I&D   No evidence of osteomyelitis on x-ray   Patient will unlikely need long-term IV antibiotic  Antibiotic plan as in above  Serial exams        Discussed with patient in detail regarding the above plan  Discussed with Orthopedic service  Okay for discharge from ID viewpoint      Antibiotics:  Vancomycin # 3  POD # 4     Subjective:  Left hand and elbow pain continues to improve, minimal now  Temperature stays down   No chills  He is tolerating antibiotic well   No nausea, vomiting or diarrhea  Objective:  Vitals:  HR:  [60-71] 62  Resp:  [17-18] 18  BP: (142-151)/(74-90) 142/83  SpO2:  [96 %-98 %] 96 %  Temp (24hrs), Av 2 °F (36 8 °C), Min:98 1 °F (36 7 °C), Max:98 3 °F (36 8 °C)  Current: Temperature: 98 3 °F (36 8 °C)    Physical Exam:     General: Awake, alert, cooperative, no distress  Lungs: Expansion symmetric, no rales, no wheezing, respirations unlabored  Heart[de-identified]  Regular rate and rhythm, S1 and S2 normal, no murmur  Abdomen: Soft, nondistended, non-tender, bowel sounds active all four quadrants,        no masses, no organomegaly  Extremities: Left hand the left elbow with dressing in place  Decreased edema  No erythema  Dressing is dry  Minimal tenderness  Skin:  No rash  Invasive Devices     Peripheral Intravenous Line            Peripheral IV 01/22/18 Right Forearm 1 day                Labs studies:   I have personally reviewed pertinent labs  Results from last 7 days  Lab Units 01/19/18  0530 01/18/18  2126   SODIUM mmol/L 136 138   POTASSIUM mmol/L 4 0 4 0   CHLORIDE mmol/L 102 102   CO2 mmol/L 28 29   ANION GAP mmol/L 6 7   BUN mg/dL 14 14   CREATININE mg/dL 0 67 0 67   EGFR ml/min/1 73sq m 101 101   GLUCOSE RANDOM mg/dL 88 91   CALCIUM mg/dL 8 6 8 5       Results from last 7 days  Lab Units 01/20/18  0454 01/19/18  0530 01/18/18  2126   WBC Thousand/uL 12 80* 10 68* 10 91*   HEMOGLOBIN g/dL 14 0 14 8 15 0   PLATELETS Thousands/uL 241 262 260       Results from last 7 days  Lab Units 01/19/18  1605 01/19/18  1601 01/18/18  2331   GRAM STAIN RESULT  2+ Polys  Rare Gram positive cocci in pairs 2+ Polys  1+ Gram positive cocci in pairs No polys seen  1+ Gram positive cocci in pairs  No polys seen  1+ Gram positive cocci in pairs   WOUND CULTURE   --   --  3+ Growth of Methicillin Resistant Staphylococcus aureus*  3+ Growth of Methicillin Resistant Staphylococcus aureus*       Imaging Studies:   I have personally reviewed pertinent imaging study reports and images in PACS  EKG, Pathology, and Other Studies:   I have personally reviewed pertinent reports

## 2018-01-23 NOTE — DISCHARGE SUMMARY
Discharge Summary - Orthopedics   Munira Schreiber  74 y o  male MRN: [id number]  Unit/Bed#: CW3 345-01    Attending Physician: Torres Cifuentes    Admitting diagnosis: Cellulitis [V49 33]    Discharge diagnosis: Cellulitis [L03 90]    Date of admission: 1/18/2018    Date of discharge: 01/23/18    Procedure: Left hand and elbow incision and drainage     HPI & Hospital course:  74 y o  male who presented to the ED with left hand swelling and purulence as well as left elbow swelling and erythema  Pt was admitted to the orthopaedic service and taken to the OR on 1/19/2018 for incision and drainage of the left hand and elbow  Prior to surgery the risks and benefits of surgery were explained and informed consent was obtained  Surgery went without complications and pt was discharged to the PACU in a stable condition and was transferred to the floor  Post operative course was accompanied by daily betadine soaks, dressing and packing changes  On discharge date pt was cleared by PT and the medicine team and determined to be safe for discharge  Daily discussion was had with the patient, nursing staff, orthopaedic team, and family members if present  All questions were answered to the patients satisifaction  0  Lab Value Date/Time   HGB 14 0 01/20/2018 0454   HGB 14 8 01/19/2018 0530   HGB 15 0 01/18/2018 2126       Discharge Instructions:   · NWB LUE  · Complete course of antibiotics as directed   · Continue daily soaks and dressing changes with the assistance of home nursing   · Follow-up as scheduled, otherwise call for appt  Discharge Medications: For the complete list of discharge medications, please refer to the patient's medication reconciliation

## 2018-01-24 ENCOUNTER — TELEPHONE (OUTPATIENT)
Dept: OBGYN CLINIC | Facility: HOSPITAL | Age: 68
End: 2018-01-24

## 2018-01-24 NOTE — TELEPHONE ENCOUNTER
Patient had surgery with Dr Yolanda Ly  Visiting nurse calling for wound care orders  Please call with verbal order   Please call Shaila Freedman at [phone]

## 2018-01-25 ENCOUNTER — TELEPHONE (OUTPATIENT)
Dept: OBGYN CLINIC | Facility: HOSPITAL | Age: 68
End: 2018-01-25

## 2018-02-01 ENCOUNTER — TELEPHONE (OUTPATIENT)
Dept: OBGYN CLINIC | Facility: HOSPITAL | Age: 68
End: 2018-02-01

## 2018-02-01 RX ORDER — CARISOPRODOL 350 MG/1
350 TABLET ORAL 2 TIMES DAILY PRN
Refills: 0 | COMMUNITY
Start: 2017-11-04 | End: 2020-03-24 | Stop reason: HOSPADM

## 2018-02-01 RX ORDER — TRAMADOL HYDROCHLORIDE 50 MG/1
50 TABLET ORAL 2 TIMES DAILY PRN
Refills: 0 | COMMUNITY
Start: 2017-11-04 | End: 2018-03-28 | Stop reason: ALTCHOICE

## 2018-02-01 NOTE — TELEPHONE ENCOUNTER
Dr Seleta Habermann patient - L hand and forearm abcess    Patient calling today to let Dr Seleta Habermann no that the Visting nurse said today the Elbow has an open area that is draining yellow drainage and is swelling  Patient has appt tomorrow for followup and he wanted to know if there is a possibility he will be admitted again  He is currently on Bactrim DS until Saturday and Betadine soaks as per orders  No pain or fever  Please advise

## 2018-02-01 NOTE — TELEPHONE ENCOUNTER
Apparently drainage to be expected b/c they purposely left wound open a bit  Can't definitively say anything about the chance of re-admittance until we take a look at it tomorrow   C/W abx and soaks

## 2018-02-02 ENCOUNTER — OFFICE VISIT (OUTPATIENT)
Dept: OBGYN CLINIC | Facility: HOSPITAL | Age: 68
End: 2018-02-02

## 2018-02-02 VITALS
DIASTOLIC BLOOD PRESSURE: 83 MMHG | HEIGHT: 70 IN | SYSTOLIC BLOOD PRESSURE: 135 MMHG | HEART RATE: 83 BPM | BODY MASS INDEX: 31.04 KG/M2 | WEIGHT: 216.8 LBS

## 2018-02-02 DIAGNOSIS — L02.512 ABSCESS OF LEFT HAND: Primary | ICD-10-CM

## 2018-02-02 DIAGNOSIS — L02.414 ABSCESS OF LEFT FOREARM: ICD-10-CM

## 2018-02-02 PROCEDURE — 99024 POSTOP FOLLOW-UP VISIT: CPT | Performed by: ORTHOPAEDIC SURGERY

## 2018-02-02 NOTE — PROGRESS NOTES
ASSESSMENT / PLAN:  Diagnoses and all orders for this visit:    Abscess of left hand    Abscess of left forearm    Other orders  -     traMADol (ULTRAM) 50 mg tablet; Take 50 mg by mouth 2 (two) times a day as needed  -     carisoprodol (SOMA) 350 mg tablet; Take 350 mg by mouth 2 (two) times a day as needed      ASSESSMENT:   Left hand abscess  and left elbow abscess status post washout and debridement  PLAN:  activity modification, Today packing was removed patient may shower  continued to keep the incision dry  Patient should continue with oral antibiotics  The patient's left hand is well-healed there is no concern for infection at this time  The patient's left elbow Still does have scant drainage and this should continue to improve over the next few weeks  FOLLOW UP:  6  week(s)        CHIEF COMPLAINT:  Chief Complaint   Patient presents with    Left Hand - Post-op         SUBJECTIVE:  Craig Perla  is a 74y o  year old male who presents for follow up after Irrigation and debridement of left elbow and left hand abscess  Surgery date January 19, 2018  Patient does report that his left hand has improved however he still does continue to have drainage from his left elbow  The patient does continue to pack his elbow daily  He has been on oral antibiotics and currently has 2 additional days left  He denies any numbness or tingling he denies any elbow joint pain     Today patient has Mild drainage  PHYSICAL EXAMINATION:  General: well developed and well nourished, alert, oriented times 3 and appears comfortable  Psychiatric: Normal    MUSCULOSKELETAL EXAMINATION:  Incision: Well healed at the left hand at the elbow incision is gradually improving there is a approximate less than once a medial opening with packing currently in place  There is some scant serous drainage that I can express  There is no surrounding erythema swelling has decreased    Range of Motion: As expected and full composite fist possible  Neurovascular status: Neuro intact, good cap refill  Activity Restrictions: Patient should continue to limit lifting on the left upper extremity less than 5 pounds    Okay for daily cleansing         STUDIES REVIEWED:  No Studies to review      PROCEDURES PERFORMED:  Procedures  No Procedures performed today

## 2018-02-05 ENCOUNTER — TELEPHONE (OUTPATIENT)
Dept: OBGYN CLINIC | Facility: HOSPITAL | Age: 68
End: 2018-02-05

## 2018-02-05 NOTE — TELEPHONE ENCOUNTER
Dr Yuko Gao - Elbow wound care    Patient was seen on 2/3/18  [facility] calling to see if they are to continue packing the elbow wound  Patient states he was told no but it is not in the note  Daily wound cleansing is noted  Please advise

## 2018-02-19 LAB
FUNGUS SPEC CULT: NORMAL
FUNGUS SPEC CULT: NORMAL

## 2018-03-06 LAB
MYCOBACTERIUM SPEC CULT: NORMAL
MYCOBACTERIUM SPEC CULT: NORMAL
RHODAMINE-AURAMINE STN SPEC: NORMAL
RHODAMINE-AURAMINE STN SPEC: NORMAL

## 2018-03-28 ENCOUNTER — OFFICE VISIT (OUTPATIENT)
Dept: UROLOGY | Facility: CLINIC | Age: 68
End: 2018-03-28
Payer: MEDICARE

## 2018-03-28 VITALS
DIASTOLIC BLOOD PRESSURE: 83 MMHG | SYSTOLIC BLOOD PRESSURE: 152 MMHG | HEART RATE: 103 BPM | WEIGHT: 216 LBS | BODY MASS INDEX: 30.92 KG/M2 | HEIGHT: 70 IN

## 2018-03-28 DIAGNOSIS — D40.0 NEOPLASM OF UNCERTAIN BEHAVIOR OF PROSTATE: ICD-10-CM

## 2018-03-28 DIAGNOSIS — N42.9 PROSTATE DISORDER: Primary | ICD-10-CM

## 2018-03-28 DIAGNOSIS — R97.20 ELEVATED PSA: ICD-10-CM

## 2018-03-28 LAB
SL AMB  POCT GLUCOSE, UA: NORMAL
SL AMB LEUKOCYTE ESTERASE,UA: NORMAL
SL AMB POCT BLOOD,UA: NORMAL
SL AMB POCT CLARITY,UA: CLEAR
SL AMB POCT COLOR,UA: YELLOW
SL AMB POCT KETONES,UA: NORMAL
SL AMB POCT NITRITE,UA: NORMAL
SL AMB POCT PH,UA: 7
SL AMB POCT URINE PROTEIN: NORMAL

## 2018-03-28 PROCEDURE — 81002 URINALYSIS NONAUTO W/O SCOPE: CPT | Performed by: UROLOGY

## 2018-03-28 PROCEDURE — 99213 OFFICE O/P EST LOW 20 MIN: CPT | Performed by: UROLOGY

## 2018-03-28 RX ORDER — COVID-19 ANTIGEN TEST
KIT MISCELLANEOUS EVERY 12 HOURS
COMMUNITY
End: 2020-03-24 | Stop reason: HOSPADM

## 2018-03-28 RX ORDER — AMLODIPINE BESYLATE 5 MG/1
5 TABLET ORAL DAILY
COMMUNITY
End: 2020-03-24 | Stop reason: HOSPADM

## 2018-03-28 NOTE — PROGRESS NOTES
Progress Note - Urology  Jorge Evans  74 y o  male MRN: [id number]  Encounter: [id number]      Chief Complaint:   Chief Complaint   Patient presents with    Prostate Disorder     6 Month Follow Up (Pt did not have PSA done prior to visit)       HPI:     68-year-old male initially referred from the [facility] for PSA elevation of 6 0  Examination revealed moderate BPH and a repeat PSA had returned 2 5  I do not have any previous history is of fluctuation of the PSA  His AUA index is 4  He denies any urologic complaints  MEDS:    Current Outpatient Prescriptions:     amLODIPine (NORVASC) 5 mg tablet, Take 5 mg by mouth daily, Disp: , Rfl:     buPROPion (WELLBUTRIN) 100 mg tablet, Take 50 mg by mouth 2 (two) times a day, Disp: , Rfl:     carisoprodol (SOMA) 350 mg tablet, Take 350 mg by mouth 2 (two) times a day as needed, Disp: , Rfl: 0    docusate sodium (COLACE) 100 mg capsule, Take 1 capsule by mouth 2 (two) times a day, Disp: 20 capsule, Rfl: 0    FLUoxetine (PROzac) 10 mg capsule, Take 10 mg by mouth daily, Disp: , Rfl:     methocarbamol (ROBAXIN) 750 mg tablet, Take 750 mg by mouth 2 (two) times a day, Disp: , Rfl:     Naproxen Sodium (ALEVE) 220 MG CAPS, Take by mouth every 12 (twelve) hours, Disp: , Rfl:     omeprazole (PriLOSEC) 20 mg delayed release capsule, Take 25 mg by mouth 2 (two) times a day, Disp: , Rfl:     pravastatin (PRAVACHOL) 20 mg tablet, Take 20 mg by mouth daily, Disp: , Rfl:     escitalopram (LEXAPRO) 20 mg tablet, Take 50 mg by mouth 2 (two) times a day, Disp: , Rfl:       PMH:  Past Medical History:   Diagnosis Date    GERD (gastroesophageal reflux disease)     Hyperlipidemia     Hypertension          ROS:  Review of Systems   Constitutional: Negative  Respiratory:          Heavy smoker mild hoarseness, denies SOB   Cardiovascular: Negative  Gastrointestinal: Negative  Musculoskeletal: Negative            Vitals:  Blood pressure 152/83, pulse 103, height 5' 10" (1 778 m), weight 98 kg (216 lb)  Physical Exam:    79-year-old male  Older than his stated age  His back reveals no CVA tenderness the abdomen is soft  Appreciate no mass  Bladder not distended  Genital structures were unremarkable  Rectal examination shows normal tone  The prostate is symmetrical without nodules  It is roughly 35 g      Lab, Imaging and other studies:  Recent Results (from the past 48 hour(s))   POCT urine dip    Collection Time: 03/28/18 10:30 AM   Result Value Ref Range    LEUKOCYTE ESTERASE,UA NEG      NITRITE,UA NEG     SL AMB POCT URINE PROTEIN NEG      PH,UA 7      BLOOD,UA NEG      KETONES,UA NEG     GLUCOSE, UA NEG      COLOR,UA YELLOW      CLARITY,UA CLEAR          IMPRESSION:   1  BPH   2   History of PSA elevation    PLAN:   continue surveillance recheck PSA to establish a pattern

## 2019-03-14 ENCOUNTER — TELEPHONE (OUTPATIENT)
Dept: UROLOGY | Facility: AMBULATORY SURGERY CENTER | Age: 69
End: 2019-03-14

## 2019-03-14 NOTE — TELEPHONE ENCOUNTER
I called pt to try and verify his address with him  Our office did try to mail him a letter informing him that his appt on 3/29/19 with Dr Thor Aase has been canceled due to Dr Thor Aase being out on medical leave, but the letter was returned stating the address is vacant  Will wait for pt to call back with updated address
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
## 2020-03-17 ENCOUNTER — HOSPITAL ENCOUNTER (EMERGENCY)
Facility: HOSPITAL | Age: 70
End: 2020-03-17
Attending: EMERGENCY MEDICINE | Admitting: EMERGENCY MEDICINE
Payer: MEDICARE

## 2020-03-17 ENCOUNTER — HOSPITAL ENCOUNTER (INPATIENT)
Facility: HOSPITAL | Age: 70
LOS: 7 days | Discharge: HOME | DRG: 885 | End: 2020-03-24
Attending: PSYCHIATRY & NEUROLOGY | Admitting: PSYCHIATRY & NEUROLOGY
Payer: MEDICARE

## 2020-03-17 VITALS
SYSTOLIC BLOOD PRESSURE: 111 MMHG | OXYGEN SATURATION: 97 % | RESPIRATION RATE: 20 BRPM | HEART RATE: 71 BPM | TEMPERATURE: 98 F | DIASTOLIC BLOOD PRESSURE: 64 MMHG

## 2020-03-17 DIAGNOSIS — Z00.8 MEDICAL CLEARANCE FOR PSYCHIATRIC ADMISSION: Primary | ICD-10-CM

## 2020-03-17 DIAGNOSIS — F29 PSYCHOSES (HCC): ICD-10-CM

## 2020-03-17 DIAGNOSIS — Z00.8 MEDICAL CLEARANCE FOR PSYCHIATRIC ADMISSION: ICD-10-CM

## 2020-03-17 DIAGNOSIS — F29 PSYCHOTIC DISORDER (HCC): Primary | ICD-10-CM

## 2020-03-17 DIAGNOSIS — R45.1 AGITATION: ICD-10-CM

## 2020-03-17 DIAGNOSIS — R45.851 SUICIDAL IDEATION: ICD-10-CM

## 2020-03-17 PROBLEM — E78.5 HYPERLIPIDEMIA: Status: ACTIVE | Noted: 2020-03-17

## 2020-03-17 PROBLEM — K21.9 GERD (GASTROESOPHAGEAL REFLUX DISEASE): Status: ACTIVE | Noted: 2020-03-17

## 2020-03-17 PROBLEM — Z72.0 TOBACCO ABUSE: Status: ACTIVE | Noted: 2020-03-17

## 2020-03-17 PROBLEM — E87.6 HYPOKALEMIA: Status: ACTIVE | Noted: 2020-03-17

## 2020-03-17 LAB
ALBUMIN SERPL BCP-MCNC: 3.9 G/DL (ref 3.5–5.7)
ALP SERPL-CCNC: 61 U/L (ref 55–165)
ALT SERPL W P-5'-P-CCNC: 9 U/L (ref 7–52)
AMPHETAMINES SERPL QL SCN: NEGATIVE
ANION GAP SERPL CALCULATED.3IONS-SCNC: 14 MMOL/L (ref 4–13)
AST SERPL W P-5'-P-CCNC: 11 U/L (ref 13–39)
BARBITURATES UR QL: NEGATIVE
BASOPHILS # BLD AUTO: 0.1 THOUSANDS/ΜL (ref 0–0.1)
BASOPHILS NFR BLD AUTO: 1 % (ref 0–2)
BENZODIAZ UR QL: NEGATIVE
BILIRUB SERPL-MCNC: 0.2 MG/DL (ref 0.2–1)
BILIRUB UR QL STRIP: NEGATIVE
BUN SERPL-MCNC: 9 MG/DL (ref 7–25)
CALCIUM SERPL-MCNC: 8.6 MG/DL (ref 8.6–10.5)
CHLORIDE SERPL-SCNC: 103 MMOL/L (ref 98–107)
CLARITY UR: CLEAR
CO2 SERPL-SCNC: 23 MMOL/L (ref 21–31)
COCAINE UR QL: NEGATIVE
COLOR UR: YELLOW
CREAT SERPL-MCNC: 0.74 MG/DL (ref 0.7–1.3)
EOSINOPHIL # BLD AUTO: 0.4 THOUSAND/ΜL (ref 0–0.61)
EOSINOPHIL NFR BLD AUTO: 7 % (ref 0–5)
ERYTHROCYTE [DISTWIDTH] IN BLOOD BY AUTOMATED COUNT: 14.8 % (ref 11.5–14.5)
ETHANOL SERPL-MCNC: <10 MG/DL
GFR SERPL CREATININE-BSD FRML MDRD: 95 ML/MIN/1.73SQ M
GLUCOSE SERPL-MCNC: 114 MG/DL (ref 65–99)
GLUCOSE UR STRIP-MCNC: NEGATIVE MG/DL
HCT VFR BLD AUTO: 44 % (ref 42–47)
HGB BLD-MCNC: 14.1 G/DL (ref 14–18)
HGB UR QL STRIP.AUTO: NEGATIVE
KETONES UR STRIP-MCNC: NEGATIVE MG/DL
LEUKOCYTE ESTERASE UR QL STRIP: NEGATIVE
LYMPHOCYTES # BLD AUTO: 2.2 THOUSANDS/ΜL (ref 0.6–4.47)
LYMPHOCYTES NFR BLD AUTO: 36 % (ref 21–51)
MCH RBC QN AUTO: 29.5 PG (ref 26–34)
MCHC RBC AUTO-ENTMCNC: 32.2 G/DL (ref 31–37)
MCV RBC AUTO: 92 FL (ref 81–99)
METHADONE UR QL: NEGATIVE
MONOCYTES # BLD AUTO: 0.4 THOUSAND/ΜL (ref 0.17–1.22)
MONOCYTES NFR BLD AUTO: 7 % (ref 2–12)
NEUTROPHILS # BLD AUTO: 3 THOUSANDS/ΜL (ref 1.4–6.5)
NEUTS SEG NFR BLD AUTO: 50 % (ref 42–75)
NITRITE UR QL STRIP: NEGATIVE
OPIATES UR QL SCN: NEGATIVE
PCP UR QL: NEGATIVE
PH UR STRIP.AUTO: 6.5 [PH]
PLATELET # BLD AUTO: 233 THOUSANDS/UL (ref 149–390)
PMV BLD AUTO: 7.5 FL (ref 8.6–11.7)
POTASSIUM SERPL-SCNC: 3.4 MMOL/L (ref 3.5–5.5)
PROT SERPL-MCNC: 6.2 G/DL (ref 6.4–8.9)
PROT UR STRIP-MCNC: NEGATIVE MG/DL
RBC # BLD AUTO: 4.79 MILLION/UL (ref 4.3–5.9)
SODIUM SERPL-SCNC: 140 MMOL/L (ref 134–143)
SP GR UR STRIP.AUTO: <1.005 (ref 1–1.03)
THC UR QL: NEGATIVE
TSH SERPL DL<=0.05 MIU/L-ACNC: 2.34 UIU/ML (ref 0.45–5.33)
UROBILINOGEN UR QL STRIP.AUTO: 0.2 E.U./DL
WBC # BLD AUTO: 6.1 THOUSAND/UL (ref 4.8–10.8)

## 2020-03-17 PROCEDURE — 99284 EMERGENCY DEPT VISIT MOD MDM: CPT | Performed by: EMERGENCY MEDICINE

## 2020-03-17 PROCEDURE — 80053 COMPREHEN METABOLIC PANEL: CPT | Performed by: EMERGENCY MEDICINE

## 2020-03-17 PROCEDURE — 99285 EMERGENCY DEPT VISIT HI MDM: CPT

## 2020-03-17 PROCEDURE — 1124F ACP DISCUSS-NO DSCNMKR DOCD: CPT | Performed by: PSYCHIATRY & NEUROLOGY

## 2020-03-17 PROCEDURE — 84443 ASSAY THYROID STIM HORMONE: CPT | Performed by: EMERGENCY MEDICINE

## 2020-03-17 PROCEDURE — 81003 URINALYSIS AUTO W/O SCOPE: CPT | Performed by: EMERGENCY MEDICINE

## 2020-03-17 PROCEDURE — 99222 1ST HOSP IP/OBS MODERATE 55: CPT | Performed by: FAMILY MEDICINE

## 2020-03-17 PROCEDURE — 80307 DRUG TEST PRSMV CHEM ANLYZR: CPT | Performed by: EMERGENCY MEDICINE

## 2020-03-17 PROCEDURE — 93005 ELECTROCARDIOGRAM TRACING: CPT

## 2020-03-17 PROCEDURE — NC001 PR NO CHARGE: Performed by: EMERGENCY MEDICINE

## 2020-03-17 PROCEDURE — 36415 COLL VENOUS BLD VENIPUNCTURE: CPT | Performed by: EMERGENCY MEDICINE

## 2020-03-17 PROCEDURE — 85025 COMPLETE CBC W/AUTO DIFF WBC: CPT | Performed by: EMERGENCY MEDICINE

## 2020-03-17 PROCEDURE — 96372 THER/PROPH/DIAG INJ SC/IM: CPT

## 2020-03-17 PROCEDURE — 80320 DRUG SCREEN QUANTALCOHOLS: CPT | Performed by: EMERGENCY MEDICINE

## 2020-03-17 RX ORDER — ONDANSETRON 4 MG/1
4 TABLET, ORALLY DISINTEGRATING ORAL ONCE
Status: COMPLETED | OUTPATIENT
Start: 2020-03-17 | End: 2020-03-17

## 2020-03-17 RX ORDER — ZIPRASIDONE MESYLATE 20 MG/ML
20 INJECTION, POWDER, LYOPHILIZED, FOR SOLUTION INTRAMUSCULAR ONCE
Status: COMPLETED | OUTPATIENT
Start: 2020-03-17 | End: 2020-03-17

## 2020-03-17 RX ORDER — MIRTAZAPINE 15 MG/1
7.5 TABLET, FILM COATED ORAL
Status: CANCELLED | OUTPATIENT
Start: 2020-03-17

## 2020-03-17 RX ORDER — MIRTAZAPINE 7.5 MG/1
7.5 TABLET, FILM COATED ORAL
Status: DISCONTINUED | OUTPATIENT
Start: 2020-03-17 | End: 2020-03-24 | Stop reason: HOSPADM

## 2020-03-17 RX ORDER — HALOPERIDOL 5 MG
5 TABLET ORAL EVERY 6 HOURS PRN
Status: CANCELLED | OUTPATIENT
Start: 2020-03-17

## 2020-03-17 RX ORDER — LORAZEPAM 2 MG/ML
2 INJECTION INTRAMUSCULAR EVERY 6 HOURS PRN
Status: DISCONTINUED | OUTPATIENT
Start: 2020-03-17 | End: 2020-03-24 | Stop reason: HOSPADM

## 2020-03-17 RX ORDER — HYDROXYZINE HYDROCHLORIDE 25 MG/1
25 TABLET, FILM COATED ORAL EVERY 6 HOURS PRN
Status: DISCONTINUED | OUTPATIENT
Start: 2020-03-17 | End: 2020-03-24 | Stop reason: HOSPADM

## 2020-03-17 RX ORDER — HALOPERIDOL 5 MG/ML
5 INJECTION INTRAMUSCULAR EVERY 6 HOURS PRN
Status: DISCONTINUED | OUTPATIENT
Start: 2020-03-17 | End: 2020-03-24 | Stop reason: HOSPADM

## 2020-03-17 RX ORDER — HYDROXYZINE HYDROCHLORIDE 25 MG/1
25 TABLET, FILM COATED ORAL EVERY 6 HOURS PRN
Status: CANCELLED | OUTPATIENT
Start: 2020-03-17

## 2020-03-17 RX ORDER — HALOPERIDOL 5 MG/ML
5 INJECTION INTRAMUSCULAR EVERY 6 HOURS PRN
Status: CANCELLED | OUTPATIENT
Start: 2020-03-17

## 2020-03-17 RX ORDER — LORAZEPAM 2 MG/ML
2 INJECTION INTRAMUSCULAR EVERY 6 HOURS PRN
Status: CANCELLED | OUTPATIENT
Start: 2020-03-17

## 2020-03-17 RX ORDER — OLANZAPINE 10 MG/1
10 TABLET ORAL ONCE
Status: COMPLETED | OUTPATIENT
Start: 2020-03-17 | End: 2020-03-17

## 2020-03-17 RX ORDER — POLYETHYLENE GLYCOL 3350 17 G/17G
17 POWDER, FOR SOLUTION ORAL DAILY PRN
Status: DISCONTINUED | OUTPATIENT
Start: 2020-03-17 | End: 2020-03-24 | Stop reason: HOSPADM

## 2020-03-17 RX ORDER — HALOPERIDOL 5 MG
5 TABLET ORAL EVERY 6 HOURS PRN
Status: DISCONTINUED | OUTPATIENT
Start: 2020-03-17 | End: 2020-03-24 | Stop reason: HOSPADM

## 2020-03-17 RX ORDER — POTASSIUM CHLORIDE 20 MEQ/1
40 TABLET, EXTENDED RELEASE ORAL ONCE
Status: COMPLETED | OUTPATIENT
Start: 2020-03-17 | End: 2020-03-17

## 2020-03-17 RX ORDER — MAGNESIUM HYDROXIDE/ALUMINUM HYDROXICE/SIMETHICONE 120; 1200; 1200 MG/30ML; MG/30ML; MG/30ML
30 SUSPENSION ORAL EVERY 4 HOURS PRN
Status: DISCONTINUED | OUTPATIENT
Start: 2020-03-17 | End: 2020-03-24 | Stop reason: HOSPADM

## 2020-03-17 RX ORDER — LORAZEPAM 2 MG/ML
1 INJECTION INTRAMUSCULAR ONCE
Status: COMPLETED | OUTPATIENT
Start: 2020-03-17 | End: 2020-03-17

## 2020-03-17 RX ORDER — MAGNESIUM HYDROXIDE/ALUMINUM HYDROXICE/SIMETHICONE 120; 1200; 1200 MG/30ML; MG/30ML; MG/30ML
30 SUSPENSION ORAL EVERY 4 HOURS PRN
Status: CANCELLED | OUTPATIENT
Start: 2020-03-17

## 2020-03-17 RX ORDER — POTASSIUM CHLORIDE 20 MEQ/1
20 TABLET, EXTENDED RELEASE ORAL ONCE
Status: COMPLETED | OUTPATIENT
Start: 2020-03-17 | End: 2020-03-17

## 2020-03-17 RX ORDER — POLYETHYLENE GLYCOL 3350 17 G/17G
17 POWDER, FOR SOLUTION ORAL DAILY PRN
Status: CANCELLED | OUTPATIENT
Start: 2020-03-17

## 2020-03-17 RX ADMIN — ZIPRASIDONE MESYLATE 20 MG: 20 INJECTION, POWDER, LYOPHILIZED, FOR SOLUTION INTRAMUSCULAR at 02:54

## 2020-03-17 RX ADMIN — ONDANSETRON 4 MG: 4 TABLET, ORALLY DISINTEGRATING ORAL at 04:15

## 2020-03-17 RX ADMIN — POTASSIUM CHLORIDE 40 MEQ: 1500 TABLET, EXTENDED RELEASE ORAL at 06:34

## 2020-03-17 RX ADMIN — LORAZEPAM 1 MG: 2 INJECTION, SOLUTION INTRAMUSCULAR; INTRAVENOUS at 02:53

## 2020-03-17 RX ADMIN — OLANZAPINE 10 MG: 10 TABLET, FILM COATED ORAL at 14:09

## 2020-03-17 RX ADMIN — POTASSIUM CHLORIDE 20 MEQ: 1500 TABLET, EXTENDED RELEASE ORAL at 18:18

## 2020-03-17 NOTE — PLAN OF CARE
Problem: Alteration in Thoughts and Perception  Goal: Treatment Goal: Gain control of psychotic behaviors/thinking, reduce/eliminate presenting symptoms and demonstrate improved reality functioning upon discharge  Outcome: Not Progressing  Goal: Verbalize thoughts and feelings  Description  Interventions:  - Promote a nonjudgmental and trusting relationship with the patient through active listening and therapeutic communication  - Assess patient's level of functioning, behavior and potential for risk  - Engage patient in 1 on 1 interactions  - Encourage patient to express fears, feelings, frustrations, and discuss symptoms    - [place]iser patient to reality, help patient recognize reality-based thinking   - Administer medications as ordered and assess for potential side effects  - Provide the patient education related to the signs and symptoms of the illness and desired effects of prescribed medications  Outcome: Not Progressing  Goal: Refrain from acting on delusional thinking/internal stimuli  Description  Interventions:  - Monitor patient closely, per order   - Utilize least restrictive measures   - Set reasonable limits, give positive feedback for acceptable   - Administer medications as ordered and monitor of potential side effects  Outcome: Not Progressing  Goal: Agree to be compliant with medication regime, as prescribed and report medication side effects  Description  Interventions:  - Offer appropriate PRN medication and supervise ingestion; conduct AIMS, as needed   Outcome: Not Progressing  Goal: Attend and participate in unit activities, including therapeutic, recreational, and educational groups  Description  Interventions:  -Encourage Visitation and family involvement in care  Outcome: Not Progressing  Goal: Recognize dysfunctional thoughts, communicate reality-based thoughts at the time of discharge  Description  Interventions:  - Provide medication and psycho-education to assist patient in compliance and developing insight into his/her illness   Outcome: Not Progressing  Goal: Complete daily ADLs, including personal hygiene independently, as able  Description  Interventions:  - Observe, teach, and assist patient with ADLS  - Monitor and promote a balance of rest/activity, with adequate nutrition and elimination   Outcome: Not Progressing     Problem: Ineffective Coping  Goal: Cooperates with admission process  Description  Interventions:   - Complete admission process  Outcome: Not Progressing  Goal: Identifies ineffective coping skills  Outcome: Not Progressing  Goal: Identifies healthy coping skills  Outcome: Not Progressing  Goal: Demonstrates healthy coping skills  Outcome: Not Progressing  Goal: Participates in unit activities  Description  Interventions:  - Provide therapeutic environment   - Provide required programming   - Redirect inappropriate behaviors   Outcome: Not Progressing  Goal: Patient/Family participate in treatment and DC plans  Description  Interventions:  - Provide therapeutic environment  Outcome: Not Progressing  Goal: Patient/Family verbalizes awareness of resources  Outcome: Not Progressing  Goal: Understands least restrictive measures  Description  Interventions:  - Utilize least restrictive behavior  Outcome: Not Progressing  Goal: Free from restraint events  Description  - Utilize least restrictive measures   - Provide behavioral interventions   - Redirect inappropriate behaviors   Outcome: Not Progressing     Problem: Risk for Self Injury/Neglect  Goal: Treatment Goal: Remain safe during length of stay, learn and adopt new coping skills, and be free of self-injurious ideation, impulses and acts at the time of discharge  Outcome: Not Progressing  Goal: Verbalize thoughts and feelings  Description  Interventions:  - Assess and re-assess patient's lethality and potential for self-injury  - Engage patient in 1:1 interactions, daily, for a minimum of 15 minutes  - Encourage patient to express feelings, fears, frustrations, hopes  - Establish rapport/trust with patient   Outcome: Not Progressing  Goal: Refrain from harming self  Description  Interventions:  - Monitor patient closely, per order  - Develop a trusting relationship  - Supervise medication ingestion, monitor effects and side effects   Outcome: Not Progressing  Goal: Attend and participate in unit activities, including therapeutic, recreational, and educational groups  Description  Interventions:  - Provide therapeutic and educational activities daily, encourage attendance and participation, and document same in the medical record  - Obtain collateral information, encourage visitation and family involvement in care   Outcome: Not Progressing  Goal: Recognize maladaptive responses and adopt new coping mechanisms  Outcome: Not Progressing  Goal: Complete daily ADLs, including personal hygiene independently, as able  Description  Interventions:  - Observe, teach, and assist patient with ADLS  - Monitor and promote a balance of rest/activity, with adequate nutrition and elimination  Outcome: Not Progressing     Problem: Depression  Goal: Treatment Goal: Demonstrate behavioral control of depressive symptoms, verbalize feelings of improved mood/affect, and adopt new coping skills prior to discharge  Outcome: Not Progressing  Goal: Verbalize thoughts and feelings  Description  Interventions:  - Assess and re-assess patient's level of risk   - Engage patient in 1:1 interactions, daily, for a minimum of 15 minutes   - Encourage patient to express feelings, fears, frustrations, hopes   Outcome: Not Progressing  Goal: Refrain from harming self  Description  Interventions:  - Monitor patient closely, per order   - Supervise medication ingestion, monitor effects and side effects   Outcome: Not Progressing  Goal: Refrain from isolation  Description  Interventions:  - Develop a trusting relationship   - Encourage socialization   Outcome: Not Progressing  Goal: Refrain from self-neglect  Outcome: Not Progressing  Goal: Attend and participate in unit activities, including therapeutic, recreational, and educational groups  Description  Interventions:  - Provide therapeutic and educational activities daily, encourage attendance and participation, and document same in the medical record   Outcome: Not Progressing  Goal: Complete daily ADLs, including personal hygiene independently, as able  Description  Interventions:  - Observe, teach, and assist patient with ADLS  -  Monitor and promote a balance of rest/activity, with adequate nutrition and elimination   Outcome: Not Progressing     Problem: Anxiety  Goal: Anxiety is at manageable level  Description  Interventions:  - Assess and monitor patient's anxiety level  - Monitor for signs and symptoms (heart palpitations, chest pain, shortness of breath, headaches, nausea, feeling jumpy, restlessness, irritable, apprehensive)  - Collaborate with interdisciplinary team and initiate plan and interventions as ordered    - [place] patient to unit/surroundings  - Explain treatment plan  - Encourage participation in care  - Encourage verbalization of concerns/fears  - Identify coping mechanisms  - Assist in developing anxiety-reducing skills  - Administer/offer alternative therapies  - Limit or eliminate stimulants  Outcome: Not Progressing     Problem: Alteration in Orientation  Goal: Treatment Goal: Demonstrate a reduction of confusion and improved orientation to person, place, time and/or situation upon discharge, according to optimum baseline/ability  Outcome: Not Progressing  Goal: Interact with staff daily  Description  Interventions:  - Assess and re-assess patient's level of orientation  - Engage patient in 1 on 1 interactions, daily, for a minimum of 15 minutes   - Establish rapport/trust with patient   Outcome: Not Progressing  Goal: Express concerns related to confused thinking related to:  Description  Interventions:  - Encourage patient to express feelings, fears, frustrations, hopes  - Assign consistent caregivers   - [place]/re-orient patient as needed  - Allow comfort items, as appropriate  - Provide visual cues, signs, etc    Outcome: Not Progressing  Goal: Allow medical examinations, as recommended  Description  Interventions:  - Provide physical/neurological exams and/or referrals, per provider   Outcome: Not Progressing  Goal: Cooperate with recommended testing/procedures  Description  Interventions:  - Determine need for ancillary testing  - Observe for mental status changes  - Implement falls/precaution protocol   Outcome: Not Progressing  Goal: Attend and participate in unit activities, including therapeutic, recreational, and educational groups  Description  Interventions:  - Provide therapeutic and educational activities daily, encourage attendance and participation, and document same in the medical record   - Provide appropriate opportunities for reminiscence   - Provide a consistent daily routine   - Encourage family contact/visitation   Outcome: Not Progressing  Goal: Complete daily ADLs, including personal hygiene independently, as able  Description  Interventions:  - Observe, teach, and assist patient with ADLS  Outcome: Not Progressing

## 2020-03-17 NOTE — ED PROVIDER NOTES
History  Chief Complaint   Patient presents with    Psychiatric Evaluation     PER POLICE PT WAS THREATENING TO GO IN ROAD AND GET HIT BY A CAR, CONFUSED, SELF CARE DEFICIT, YELLING AND SCREAMING  ARRIVED IN HAND CUFFS AND LEG RESTAINTS  Psychiatric Evaluation       Prior to Admission Medications   Prescriptions Last Dose Informant Patient Reported? Taking?    FLUoxetine (PROzac) 10 mg capsule   Yes No   Sig: Take 10 mg by mouth daily   Naproxen Sodium (ALEVE) 220 MG CAPS   Yes No   Sig: Take by mouth every 12 (twelve) hours   amLODIPine (NORVASC) 5 mg tablet   Yes No   Sig: Take 5 mg by mouth daily   buPROPion (WELLBUTRIN) 100 mg tablet   Yes No   Sig: Take 50 mg by mouth 2 (two) times a day   carisoprodol (SOMA) 350 mg tablet   Yes No   Sig: Take 350 mg by mouth 2 (two) times a day as needed   docusate sodium (COLACE) 100 mg capsule   No No   Sig: Take 1 capsule by mouth 2 (two) times a day   Patient not taking: Reported on 3/17/2020   escitalopram (LEXAPRO) 20 mg tablet   Yes No   Sig: Take 50 mg by mouth 2 (two) times a day   methocarbamol (ROBAXIN) 750 mg tablet   Yes No   Sig: Take 750 mg by mouth 2 (two) times a day   omeprazole (PriLOSEC) 20 mg delayed release capsule   Yes No   Sig: Take 25 mg by mouth 2 (two) times a day   pravastatin (PRAVACHOL) 20 mg tablet   Yes No   Sig: Take 20 mg by mouth daily      Facility-Administered Medications: None       Past Medical History:   Diagnosis Date    Anxiety     Depression     GERD (gastroesophageal reflux disease)     Hyperlipidemia     Hypertension     Memory loss     MRSA (methicillin resistant staph aureus) culture positive     [facility]        Past Surgical History:   Procedure Laterality Date    HAND DEBRIDEMENT Left 1/19/2018    Procedure: DEBRIDEMENT HAND/FINGER [facility] OUT): left thumb - deep abscess measuring 3 cm x 1 cm x 1 cm;  Surgeon: Karla Paredes MD;  Location: [facility];  Service: Orthopedics    WOUND DEBRIDEMENT Left 1/19/2018    Procedure: DEBRIDEMENT UPPER EXTREMITY ([address]): left elbow deep abscess over the olecranon measuring 4 cm x 4 cm x 2 cm;  Surgeon: Wojciech Marshall MD;  [facility]: [facility];  Service: Orthopedics       Family History   Problem Relation Age of Onset    No Known Problems Mother     No Known Problems Father      I have reviewed and agree with the history as documented      E-Cigarette/Vaping    E-Cigarette Use Never User      E-Cigarette/Vaping Substances     Social History     Tobacco Use    Smoking status: Current Every Day Smoker     Packs/day: 1 00     Types: Cigarettes    Smokeless tobacco: Never Used   Substance Use Topics    Alcohol use: Not Currently     Alcohol/week: 6 0 standard drinks     Types: 6 Cans of beer per week     Frequency: 2-4 times a month     Drinks per session: 5 or 6     Binge frequency: Monthly     Comment: on weekends    Drug use: No       Review of Systems    Physical Exam  Physical Exam    Vital Signs  ED Triage Vitals   Temperature Pulse Respirations Blood Pressure SpO2   03/17/20 0248 03/17/20 0250 03/17/20 0248 03/17/20 0250 03/17/20 0250   99 6 °F (37 6 °C) 105 18 156/91 95 %      Temp Source Heart Rate Source Patient Position - Orthostatic VS BP Location FiO2 (%)   03/17/20 1049 03/17/20 1049 03/17/20 1049 03/17/20 1049 --   Temporal Monitor Lying Left arm       Pain Score       --                  Vitals:    03/17/20 0250 03/17/20 1049 03/17/20 1436 03/17/20 1445   BP: 156/91 114/72 111/64 111/64   Pulse: 105 61 71    Patient Position - Orthostatic VS:  Lying Sitting Sitting         Visual Acuity      ED Medications  Medications   ziprasidone (GEODON) IM injection 20 mg (20 mg Intramuscular Given 3/17/20 0254)   LORazepam (ATIVAN) injection 1 mg (1 mg Intramuscular Given 3/17/20 0253)   ondansetron (ZOFRAN-ODT) dispersible tablet 4 mg (4 mg Oral Given 3/17/20 0318)   potassium chloride (K-DUR,KLOR-CON) CR tablet 40 mEq (40 mEq Oral Given 3/17/20 4093) OLANZapine (ZyPREXA) tablet 10 mg (10 mg Oral Given 3/17/20 6589)       Diagnostic Studies  Results Reviewed     Procedure Component Value Units Date/Time    UA w Reflex to Microscopic w Reflex to Culture [[id number]]  (Abnormal) Collected:  03/17/20 0433    Lab Status:  Final result Specimen:  Urine Updated:  03/17/20 0455     Color, UA Yellow     Clarity, UA Clear     Specific Gravity, UA <1 005     pH, UA 6 5     Leukocytes, UA Negative     Nitrite, UA Negative     Protein, UA Negative mg/dl      Glucose, UA Negative mg/dl      Ketones, UA Negative mg/dl      Urobilinogen, UA 0 2 E U /dl      Bilirubin, UA Negative     Blood, UA Negative    Rapid drug screen, urine [[id number]]  (Normal) Collected:  03/17/20 0434    Lab Status:  Final result Specimen:  Urine Updated:  03/17/20 0453     Amph/Meth UR Negative     Barbiturate Ur Negative     Benzodiazepine Urine Negative     Cocaine Urine Negative     Methadone Urine Negative     Opiate Urine Negative     PCP Ur Negative     THC Urine Negative    Narrative:       FOR MEDICAL PURPOSES ONLY  IF CONFIRMATION NEEDED PLEASE CONTACT THE LAB WITHIN 5 DAYS  Drug Screen Cutoff Levels:  AMPHETAMINE/METHAMPHETAMINES  1000 ng/mL  BARBITURATES     200 ng/mL  BENZODIAZEPINES     200 ng/mL  COCAINE      300 ng/mL  METHADONE      300 ng/mL  OPIATES      300 ng/mL  PHENCYCLIDINE     25 ng/mL  THC       50 ng/mL      TSH [[id number]]  (Normal) Collected:  03/17/20 0257    Lab Status:  Final result Specimen:  Blood from Arm, Right Updated:  03/17/20 0344     TSH 3RD GENERATON 2 340 uIU/mL     Narrative:       Patients undergoing fluorescein dye angiography may retain small amounts of fluorescein in the body for 48-72 hours post procedure  Samples containing fluorescein can produce falsely depressed TSH values  If the patient had this procedure,a specimen should be resubmitted post fluorescein clearance        Ethanol [[id number]]  (Normal) Collected:  03/17/20 0257    Lab Status: Final result Specimen:  Blood from Arm, Right Updated:  03/17/20 0329     Ethanol Lvl <10 mg/dL     Comprehensive metabolic panel [[id number]]  (Abnormal) Collected:  03/17/20 0257    Lab Status:  Final result Specimen:  Blood from Arm, Right Updated:  03/17/20 0329     Sodium 140 mmol/L      Potassium 3 4 mmol/L      Chloride 103 mmol/L      CO2 23 mmol/L      ANION GAP 14 mmol/L      BUN 9 mg/dL      Creatinine 0 74 mg/dL      Glucose 114 mg/dL      Calcium 8 6 mg/dL      AST 11 U/L      ALT 9 U/L      Alkaline Phosphatase 61 U/L      Total Protein 6 2 g/dL      Albumin 3 9 g/dL      Total Bilirubin 0 20 mg/dL      eGFR 95 ml/min/1 73sq m     Narrative:       National Kidney Disease Foundation guidelines for Chronic Kidney Disease (CKD):     Stage 1 with normal or high GFR (GFR > 90 mL/min/1 73 square meters)    Stage 2 Mild CKD (GFR = 60-89 mL/min/1 73 square meters)    Stage 3A Moderate CKD (GFR = 45-59 mL/min/1 73 square meters)    Stage 3B Moderate CKD (GFR = 30-44 mL/min/1 73 square meters)    Stage 4 Severe CKD (GFR = 15-29 mL/min/1 73 square meters)    Stage 5 End Stage CKD (GFR <15 mL/min/1 73 square meters)  Note: GFR calculation is accurate only with a steady state creatinine    CBC and differential [[id number]]  (Abnormal) Collected:  03/17/20 0257    Lab Status:  Final result Specimen:  Blood from Arm, Right Updated:  03/17/20 0316     WBC 6 10 Thousand/uL      RBC 4 79 Million/uL      Hemoglobin 14 1 g/dL      Hematocrit 44 0 %      MCV 92 fL      MCH 29 5 pg      MCHC 32 2 g/dL      RDW 14 8 %      MPV 7 5 fL      Platelets 549 Thousands/uL      Neutrophils Relative 50 %      Lymphocytes Relative 36 %      Monocytes Relative 7 %      Eosinophils Relative 7 %      Basophils Relative 1 %      Neutrophils Absolute 3 00 Thousands/µL      Lymphocytes Absolute 2 20 Thousands/µL      Monocytes Absolute 0 40 Thousand/µL      Eosinophils Absolute 0 40 Thousand/µL      Basophils Absolute 0 10 Thousands/µL No orders to display              Procedures  ECG 12 Lead Documentation Only  Date/Time: 3/17/2020 1:12 PM  Performed by: Fred Mason DO  Authorized by: Fred Mason DO     Indications / Diagnosis:  Medical clearance for psychiatric admission  ECG reviewed by me, the ED Provider: yes    Patient location:  ED  Interpretation:     Interpretation: normal    Quality:     Tracing quality:  Limited by artifact  Rate:     ECG rate:  66    ECG rate assessment: normal    Rhythm:     Rhythm: sinus rhythm    Ectopy:     Ectopy: none    QRS:     QRS axis:  Normal  Conduction:     Conduction: abnormal      Abnormal conduction: bifascicular block    ST segments:     ST segments:  Normal  T waves:     T waves: normal               ED Course  ED Course as of Mar 18 0953   Tue Mar 17, 2020   1044 Patient interviewed and examined by me, available labs and studies reviewed and found to be medically stable for crisis evaluation and transfer and admission to a psychiatric treatment facility  There are no medical conditions which are untreated, unstable or requiring acute intervention  1059 Patient willingly signed a 201  (302 was completed incorrectly ) Bed search is underway  1217 Patient being reviewed by [facility] behavior health unit       1401 Patient appears to be responding to internal stimuli  Will medicate                                       MDM      Disposition  Final diagnoses:   Psychoses ([facility] )   Agitation   Suicidal ideation     Time reflects when diagnosis was documented in both MDM as applicable and the Disposition within this note     Time User Action Codes Description Comment    3/17/2020  2:08 PM Hortencia Vivar [Z00 8] Medical clearance for psychiatric admission     3/17/2020  2:38 PM [address] [place] [address]  [address] [facility] [address] [F29] Psychoses ([facility] )     3/17/2020  2:38  [facility] [R45 1] Agitation     3/17/2020  2:38 PM [address] [place] [address] Suicidal ideation       ED Disposition ED Disposition Condition Date/Time Comment    Transfer to [facility] Mar 17, 2020  2:38 PM Georgie Willis  should be transferred out to [address] [facility] and has been medically cleared  MD Documentation      Most Recent Value   Sending MD Dr Deanna Gamez DO      RN Documentation      Most Recent Value   Transport Mode  Ambulance   Level of Care  Basic life support   Copies of Medical Records Sent  History and Physical, Progress note, Orders, EKG   Patient Belongings Disposition  Sent with patient   Transfer Date  03/17/20   Transfer Time  1509      Follow-up Information    None         Discharge Medication List as of 3/17/2020  3:45 PM      CONTINUE these medications which have NOT CHANGED    Details   amLODIPine (NORVASC) 5 mg tablet Take 5 mg by mouth daily, Historical Med      buPROPion (WELLBUTRIN) 100 mg tablet Take 50 mg by mouth 2 (two) times a day, Historical Med      carisoprodol (SOMA) 350 mg tablet Take 350 mg by mouth 2 (two) times a day as needed, Starting Sat 11/4/2017, Historical Med      docusate sodium (COLACE) 100 mg capsule Take 1 capsule by mouth 2 (two) times a day, Starting Tue 1/23/2018, Print      escitalopram (LEXAPRO) 20 mg tablet Take 50 mg by mouth 2 (two) times a day, Historical Med      FLUoxetine (PROzac) 10 mg capsule Take 10 mg by mouth daily, Historical Med      methocarbamol (ROBAXIN) 750 mg tablet Take 750 mg by mouth 2 (two) times a day, Historical Med      Naproxen Sodium (ALEVE) 220 MG CAPS Take by mouth every 12 (twelve) hours, Historical Med      omeprazole (PriLOSEC) 20 mg delayed release capsule Take 25 mg by mouth 2 (two) times a day, Historical Med      pravastatin (PRAVACHOL) 20 mg tablet Take 20 mg by mouth daily, Historical Med           No discharge procedures on file      PDMP Review     None          ED Provider  Electronically Signed by           Jeremias Robert DO  03/18/20 1007

## 2020-03-17 NOTE — NURSING NOTE
Pt admitted voluntarily to the unit from ED @ [address]  Presents as cooperative and disorganized  Reported that he had been staying in a house "[place] to the place" where he used to work  Reported that he was given permission from the people who owned the house to stay there for a night a or two  Stated, "next thing I know the  came in the middle of the night and and brought me to the hospital"  Identified financial stressors such as "having problems" with his social security check and "Dime being repossessed" last week  Pt does not recall being in restraints last evening in the ED or having suicidal thoughts  Admits to depression only and no psychiatric history  Denies taking medication and stated he does not have a PCP  Affect constricted  Poor recall  Was able to state, name, location of hospital but was unable to recall month (stated August)  Later corrected self by saying "wait I think its April "  Medical history significant for HTN, hx MRSA left elbow (closed), high cholesterol, lipoma  Pt denies history of stroke  Speech is garbled  Oriented to the unit  Appetite 75% for dinner  Will continue to monitor and maintain q 7 min checks

## 2020-03-17 NOTE — CONSULTS
Consult- Christopher Riddle  1950, 81 y o  male MRN: [id number]    Unit/Bed#: [facility] 949-16 Encounter: [id number]    Primary Care Provider: Bridger Lee DO   Date and time admitted to hospital: 3/17/2020  4:03 PM      Inpatient consult for Medical Clearance for [facility] patient  Consult performed by: Elva Sevilla MD  Consult ordered by: Selam Reyna MD          * Suicidal ideation  Assessment & Plan  As per psychiatry    Tobacco abuse  Assessment & Plan  Counseling provided, nicorette gum    Hypokalemia  Assessment & Plan  Potassium of 3 4 noted  Will replace and repeat labs  GERD (gastroesophageal reflux disease)  Assessment & Plan  As per patient, does not take any home medication  Currently denies any abdominal discomfort    Hyperlipidemia  Assessment & Plan  As per patient, he is not taking any medication at home currently  Does not have primary care physician  Will obtain lipid panel    Medical clearance for psychiatric admission  Assessment & Plan  Reviewed EKG, normal sinus rhythm  Patient is medically cleared to be admitted in inpatient behavioral unit    Hypertension  Noted to have this diagnosis on the chart, noted that he was prescribed amlodipine 10 mg in the past   However patient is unaware of this diagnosis and does not take any medication for past 1 year  His blood pressure is on the lower side on admission  Will not resume any blood pressure medication      VTE Prophylaxis: Reason for no pharmacologic prophylaxis admitted in psych  / reason for no mechanical VTE prophylaxis admitted in psych     Recommendations for Discharge:  · As per psychiatry    Counseling / Coordination of Care Time: 45 minutes  Greater than 50% of total time spent on patient counseling and coordination of care  Collaboration of Care:  Were Recommendations Directly Discussed with Primary Treatment Team? - No     History of Present Illness:    Christopher Riddle  is a 81 y o  male who is originally admitted to the inpatient behavioral service due to suicidal ideation and attempt  We are consulted for medical management  As per patient, he does not take any home medication for several years  Patient is unaware that he has diagnosis of hypertension  Currently he is not taking any medication  Currently denies any chest pain, dyspnea, headache, nausea or vomiting, Abdominal pain or any other complaints    Review of Systems:    Review of Systems   Constitutional: Negative for activity change and appetite change  HENT: Negative for congestion and sore throat  Eyes: Negative for pain and visual disturbance  Respiratory: Negative for cough and shortness of breath  Cardiovascular: Negative for chest pain and leg swelling  Gastrointestinal: Negative for abdominal distention and abdominal pain  Endocrine: Negative for polyuria  Genitourinary: Negative for difficulty urinating  Musculoskeletal: Negative for arthralgias and joint swelling  Skin: Negative for wound  Allergic/Immunologic: Negative for food allergies  Neurological: Negative for light-headedness and headaches  Hematological: Negative for adenopathy  Psychiatric/Behavioral: Negative for sleep disturbance  The patient is nervous/anxious           Depression       Past Medical and Surgical History:     Past Medical History:   Diagnosis Date    Depression     GERD (gastroesophageal reflux disease)     Hyperlipidemia     Hypertension     MRSA (methicillin resistant staph aureus) culture positive     Psychosis ([facility] )        Past Surgical History:   Procedure Laterality Date    HAND DEBRIDEMENT Left 1/19/2018    Procedure: DEBRIDEMENT HAND/FINGER [facility] OUT): left thumb - deep abscess measuring 3 cm x 1 cm x 1 cm;  Surgeon: Rajinder Telles MD;  [facility];  Service: Orthopedics    WOUND DEBRIDEMENT Left 1/19/2018    Procedure: DEBRIDEMENT UPPER EXTREMITY [facility] OUT): left elbow deep abscess over the olecranon measuring 4 cm x 4 cm x 2 joaquin;  Surgeon: Kai Nolasco MD;  [facility];  Service: Orthopedics       Meds/Allergies:    all medications and allergies reviewed    Allergies: No Known Allergies    Social History:     Marital Status: Single    Substance Use History:   Social History     Substance and Sexual Activity   Alcohol Use Yes    Alcohol/week: 6 0 standard drinks    Types: 6 Cans of beer per week    Comment: on weekends     Social History     Tobacco Use   Smoking Status Current Every Day Smoker    Packs/day: 1 00    Types: Cigarettes   Smokeless Tobacco Never Used     Social History     Substance and Sexual Activity   Drug Use No       Family History:    Family History   Problem Relation Age of Onset    No Known Problems Mother     No Known Problems Father        Physical Exam:     Vitals:   Blood Pressure: 106/62 (03/17/20 1545)  Pulse: 60 (03/17/20 1545)  Temperature: 98 °F (36 7 °C) (03/17/20 1545)  Temp Source: Temporal (03/17/20 1545)  Respirations: 20 (03/17/20 1545)  Height: 5' 10" (177 8 cm) (03/17/20 1545)  Weight - Scale: 74 8 kg (165 lb) (03/17/20 1545)  SpO2: 96 % (03/17/20 1545)    Physical Exam   Constitutional: He appears well-developed and well-nourished  HENT:   Head: Normocephalic and atraumatic  Right Ear: External ear normal    Left Ear: External ear normal    Nose: Nose normal    Mouth/Throat: Oropharynx is clear and moist    Eyes: Conjunctivae and EOM are normal    Neck: Normal range of motion  Neck supple  Cardiovascular: Normal rate, regular rhythm and normal heart sounds  Pulmonary/Chest: Effort normal and breath sounds normal    Abdominal: Soft  Bowel sounds are normal    Genitourinary:   Genitourinary Comments: deferred   Neurological: He is alert  Cranial nerve 2 -12 are normal    Skin: Skin is warm and dry  Two lipomas noted on the back of neck, and one lipoma noted on thoracic back  Psychiatric: He has a normal mood and affect  Additional Data:     Lab Results:  I have personally reviewed pertinent reports  Results from last 7 days   Lab Units 03/17/20  0257   WBC Thousand/uL 6 10   HEMOGLOBIN g/dL 14 1   HEMATOCRIT % 44 0   PLATELETS Thousands/uL 233   NEUTROS PCT % 50   LYMPHS PCT % 36   MONOS PCT % 7   EOS PCT % 7*     Results from last 7 days   Lab Units 03/17/20  0257   SODIUM mmol/L 140   POTASSIUM mmol/L 3 4*   CHLORIDE mmol/L 103   CO2 mmol/L 23   BUN mg/dL 9   CREATININE mg/dL 0 74   ANION GAP mmol/L 14*   CALCIUM mg/dL 8 6   ALBUMIN g/dL 3 9   TOTAL BILIRUBIN mg/dL 0 20   ALK PHOS U/L 61   ALT U/L 9   AST U/L 11*   GLUCOSE RANDOM mg/dL 114*             No results found for: HGBA1C            Imaging: I have personally reviewed pertinent reports  No orders to display       EKG, Pathology, and Other Studies Reviewed on Admission:   · EKG: reviewed, NSR, normal QTC    ** Please Note: This note has been constructed using a voice recognition system   **

## 2020-03-17 NOTE — ED NOTES
Crisis Worker approached to attempt to assess the patient for appropriateness  He is snoring loudly  Per RN, he does wake at intervals, but speech remains very garbled and difficult to understand

## 2020-03-17 NOTE — ED NOTES
Crisis approached patient in an effort to complete a Crisis assessment  Patient is no longer hostile or verbally combative, but he is rather sedated  He was able to demonstrate orientation for person, place, and date, but continued to doze at intervals despite verbal prompts  His speech was increasingly slurred, garbled, and Crisis Worker and nurse were unable to decipher his responses to questions, which are limited at best at this time due to sedation  Will defer complete assessment until sedation effects wear off and patient is able to participate effectively  Patient informed of this and mumbled a response, seemingly to indicate understanding and agreement, as he appeared to fall immediately back to sleep

## 2020-03-17 NOTE — ED NOTES
Patient was approached after being restrained by security, nursing staff, and police / EMTs  With several staff present as witnesses, patient was read his 36 rights out loud by this Crisis Worker  A copy of his rights were presented to him along with a Patient's Bill of Rights  Patient is sarcastic and hostile / irritable  When asked if he has any questions, he states, "it doesn't do any good  I'm not gonna get any answers"  Advised that staff will make an effort to answer any questions he has and he replied, "no you won't"  Patient appears paranoid  Crisis explained the process of medical clearance and intent for a crisis assessment following same  He does appear to understand this and his rights

## 2020-03-17 NOTE — ED NOTES
Insurance Authorization for admission:   No pre-cert required, Medicare A&B is primary    EVS (Eligibility Verification System) called - [phone]    Automated system indicates: EVS called, pt not eligible for [employer] Authorization for Transportation:    No transport auth necessary, Medicare A& B is primary

## 2020-03-17 NOTE — ED NOTES
C/O NAUSEA-TX GIVEN  VOIDED 700 CC IN URINAL IN BED WITH ASSISTANCE  REMOVED RIGHT WRIST RESTRAINT  PT WAKES EASILY BUT SPEECH IS SLURRED AND DIFFICULT TO UNDERSTAND NOW/AFTER MEDICATIONS        Jerry Pina RN  03/17/20 0022

## 2020-03-17 NOTE — ED NOTES
Patient is accepted at [facility]  Patient is accepted by Dr Nguyễn Das per Eva Alcala,       Transportation is arranged with [facility]  Transportation is scheduled for 15:00  Patient may go to the floor upon arrival w/ EMS  Nurse report is to be called to [phone] prior to patient transfer

## 2020-03-17 NOTE — ED NOTES
Patient is now sitting in the chair aside of of his bed and whispering to himself  Appears to be responding to internal stimuli  Dr Rylie Parker aware       Lucy Barroso, ROBYN  03/17/20 1400

## 2020-03-17 NOTE — ED PROVIDER NOTES
History  No chief complaint on file  Patient brought in in police custody with a 443 stating that he was a clear and present danger to himself based on the petition is stating delusional thinking aggressive behavior over the past 30 days patient is homeless  Patient is refusing to answer any questions at this time and is in restraints after having struggled finally violently with police petition also states that patient has been discharged old unkempt smells of urine and feces appears malnourished says that he repeats that he refuses to get on that space craft also states that he hopes a car will hit and kill him          Prior to Admission Medications   Prescriptions Last Dose Informant Patient Reported? Taking?    FLUoxetine (PROzac) 10 mg capsule   Yes No   Sig: Take 10 mg by mouth daily   Naproxen Sodium (ALEVE) 220 MG CAPS   Yes No   Sig: Take by mouth every 12 (twelve) hours   amLODIPine (NORVASC) 5 mg tablet   Yes No   Sig: Take 5 mg by mouth daily   buPROPion (WELLBUTRIN) 100 mg tablet   Yes No   Sig: Take 50 mg by mouth 2 (two) times a day   carisoprodol (SOMA) 350 mg tablet   Yes No   Sig: Take 350 mg by mouth 2 (two) times a day as needed   docusate sodium (COLACE) 100 mg capsule   No No   Sig: Take 1 capsule by mouth 2 (two) times a day   escitalopram (LEXAPRO) 20 mg tablet   Yes No   Sig: Take 50 mg by mouth 2 (two) times a day   methocarbamol (ROBAXIN) 750 mg tablet   Yes No   Sig: Take 750 mg by mouth 2 (two) times a day   omeprazole (PriLOSEC) 20 mg delayed release capsule   Yes No   Sig: Take 25 mg by mouth 2 (two) times a day   pravastatin (PRAVACHOL) 20 mg tablet   Yes No   Sig: Take 20 mg by mouth daily      Facility-Administered Medications: None       Past Medical History:   Diagnosis Date    GERD (gastroesophageal reflux disease)     Hyperlipidemia     Hypertension        Past Surgical History:   Procedure Laterality Date    HAND DEBRIDEMENT Left 1/19/2018    Procedure: DEBRIDEMENT HAND/FINGER [facility] OUT): left thumb - deep abscess measuring 3 cm x 1 cm x 1 cm;  Surgeon: Mary Jo Hui MD;  [facility];  Service: Orthopedics    WOUND DEBRIDEMENT Left 1/19/2018    Procedure: DEBRIDEMENT UPPER EXTREMITY ([address]): left elbow deep abscess over the olecranon measuring 4 cm x 4 cm x 2 cm;  Surgeon: Mary Jo Hui MD;  [facility];  Service: Orthopedics       Family History   Problem Relation Age of Onset    No Known Problems Mother     No Known Problems Father      I have reviewed and agree with the history as documented  E-Cigarette/Vaping     E-Cigarette/Vaping Substances     Social History     Tobacco Use    Smoking status: Current Every Day Smoker     Packs/day: 1 00     Types: Cigarettes    Smokeless tobacco: Never Used   Substance Use Topics    Alcohol use: Yes     Alcohol/week: 6 0 standard drinks     Types: 6 Cans of beer per week     Comment: on weekends    Drug use: No       Review of Systems   Unable to perform ROS: Psychiatric disorder       Physical Exam  Physical Exam   Constitutional: No distress  HENT:   Head: Normocephalic and atraumatic  Eyes: EOM are normal    Neck: Normal range of motion  Neck supple  Cardiovascular: Regular rhythm and normal heart sounds  Pulmonary/Chest: Effort normal and breath sounds normal    Abdominal: Soft  Bowel sounds are normal  He exhibits no mass  There is no tenderness  There is no guarding  Musculoskeletal: He exhibits no edema  Neurological: He is alert  Skin: Skin is warm and dry  Psychiatric:   Agitated and swearing insults at me       Vital Signs  ED Triage Vitals [03/17/20 0248]   Temperature Pulse Respirations BP SpO2   99 6 °F (37 6 °C) -- 18 -- --      Temp src Heart Rate Source Patient Position - Orthostatic VS BP Location FiO2 (%)   -- -- -- -- --      Pain Score       --           There were no vitals filed for this visit        Visual Acuity      ED Medications  Medications ziprasidone (GEODON) IM injection 20 mg (has no administration in time range)   LORazepam (ATIVAN) injection 1 mg (has no administration in time range)       Diagnostic Studies  Results Reviewed     Procedure Component Value Units Date/Time    CBC and differential [[id number]]     Lab Status:  No result Specimen:  Blood     Comprehensive metabolic panel [[id number]]     Lab Status:  No result Specimen:  Blood     Rapid drug screen, urine [[id number]]     Lab Status:  No result Specimen:  Urine     TSH [[id number]]     Lab Status:  No result Specimen:  Blood     UA w Reflex to Microscopic w Reflex to Culture [[id number]]     Lab Status:  No result Specimen:  Urine     Ethanol [[id number]]     Lab Status:  No result Specimen:  Blood                  No orders to display              Procedures  Procedures         ED Course                                 MDM  Number of Diagnoses or Management Options  Diagnosis management comments: Likely psychosis will check labs and tox screen no evidence of trauma    0700 signing out  Crisis consult pending when less somnolent s/p sedation        Disposition  Final diagnoses:   None     ED Disposition     None      Follow-up Information    None         Patient's Medications   Discharge Prescriptions    No medications on file     No discharge procedures on file      PDMP Review     None          ED Provider  Electronically Signed by           Iván Sanon MD  03/17/20 7260

## 2020-03-17 NOTE — ASSESSMENT & PLAN NOTE
Reviewed EKG, normal sinus rhythm  Patient is medically cleared to be admitted in inpatient behavioral unit

## 2020-03-17 NOTE — ED NOTES
PT NOW CALM, ALLOWED ME TO PULL PANTS DOWN AND PLACE URINAL  UNABLE TO VOID, WATER GIVEN TO DRINK  NO LONGER VERBALLY ABUSIVE        Yari Solis RN  03/17/20 6908

## 2020-03-17 NOTE — ASSESSMENT & PLAN NOTE
As per patient, he is not taking any medication at home currently  Does not have primary care physician   Will obtain lipid panel

## 2020-03-17 NOTE — EMTALA/ACUTE CARE TRANSFER
[facility]  EMERGENCY DEPARTMENT  [address] [facility] [address]  [phone]  Dept: [phone]      [address] TRANSFER CONSENT    NAME Smith CUELLO 1950                              MRN [id number]    I have been informed of my rights regarding examination, treatment, and transfer   by Dr Alison Benson MD    Benefits:   definitive care, including inpatient psychiatric evaluation and treatment    Risks:   delay of care, worsening of condition, MVA      Consent for Transfer:  I acknowledge that my medical condition has been evaluated and explained to me by the emergency department physician or other qualified medical person and/or my attending physician, who has recommended that I be transferred to the service of   Dr Amie Reyes at  [facility]  The above potential benefits of such transfer, the potential risks associated with such transfer, and the probable risks of not being transferred have been explained to me, and I fully understand them  The doctor has explained that, in my case, the benefits of transfer outweigh the risks  I agree to be transferred  I authorize the performance of emergency medical procedures and treatments upon me in both transit and upon arrival at the receiving facility  Additionally, I authorize the release of any and all medical records to the receiving facility and request they be transported with me, if possible  I understand that the safest mode of transportation during a medical emergency is an ambulance and that the Hospital advocates the use of this mode of transport  Risks of traveling to the receiving facility by car, including absence of medical control, life sustaining equipment, such as oxygen, and medical personnel has been explained to me and I fully understand them  (MARYCRUZ CORRECT BOX BELOW)  [  ]  I consent to the stated transfer and to be transported by ambulance/helicopter    [  ]  I consent to the stated transfer, but refuse transportation by ambulance and accept full responsibility for my transportation by car  I understand the risks of non-ambulance transfers and I exonerate the Hospital and its staff from any deterioration in my condition that results from this refusal     X___________________________________________    DATE  20  TIME________  Signature of patient or legally responsible individual signing on patient behalf           RELATIONSHIP TO PATIENT_________________________          Provider Certification    NAME Heladio Roman   1950                              MRN [id number]    A medical screening exam was performed on the above named patient  Based on the examination:    Condition Necessitating Transfer The primary encounter diagnosis was Medical clearance for psychiatric admission  Diagnoses of Psychoses ([address] ), Agitation, and Suicidal ideation were also pertinent to this visit  Patient Condition:   stable    Reason for Transfer:   inpatient psychiatric admission    Transfer Requirements: Facility   [facility]  · Space available and qualified personnel available for treatment as acknowledged by  Dr Akira Obrien  · Agreed to accept transfer and to provide appropriate medical treatment as acknowledged by         Dr Akira Obrien  · Appropriate medical records of the examination and treatment of the patient are provided at the time of transfer   [address] _______  · Transfer will be performed by qualified personnel from    and appropriate transfer equipment as required, including the use of necessary and appropriate life support measures      Provider Certification: I have examined the patient and explained the following risks and benefits of being transferred/refusing transfer to the patient/family:   as above      Based on these reasonable risks and benefits to the patient and/or the unborn child(blanche), and based upon the information available at the time of the patients examination, I certify that the medical benefits reasonably to be expected from the provision of appropriate medical treatments at another medical facility outweigh the increasing risks, if any, to the individuals medical condition, and in the case of labor to the unborn child, from effecting the transfer      X____________________________________________ DATE 03/17/20        TIME_______      ORIGINAL - SEND TO MEDICAL RECORDS   COPY - SEND WITH PATIENT DURING TRANSFER

## 2020-03-17 NOTE — ED NOTES
Pt presents due to a 302 petition alleging a failure to thrive and delusions  Pt is A&O X 3, medically clear, calm and cooperative at this time  Pt was able to answer most assessment questions w/ some encouragement  Pt denies any current SI and does not remember stating he wanted to walk into traffic and be hit by a car and killed  as the 302 states  Pt denies any HI or thoughts to hurt others  Pt does endorse increased depression and states he is homeless and has not been caring for himself  Pt denies any increase in anxiety but admits to feeling irritable/agitated more frequently  Pt further c/o decreased appetite but not mention any difficulty sleeping  Pt also states he has been hearing voices but no one is there so he knows something is wrong, pt denies any VH or delusions but it is unclear if pt would be able to identify his delusions from reality  Pt was able to recall his 36 Rights being read to him and when asked if he wanted to downgrade to a voluntary status he asked this writer, "what's the difference?"  Upon being explained the difference between involuntary and voluntary committments the pt agreed to sign voluntary papaerwork, e g  201  Pt's 201 was signed and he was offered a copy of his Rights and his Edwinna Crimes of Rights, both of which he requested be placed on his chart

## 2020-03-17 NOTE — ED NOTES
EVS (Eligibility Verification System) called - [phone]  Automated system indicates: patient is not eligible  Patient appears to have Medicare only  Original 302 received from [facility]

## 2020-03-18 LAB
ANION GAP SERPL CALCULATED.3IONS-SCNC: 3 MMOL/L (ref 5–14)
ATRIAL RATE: 66 BPM
BUN SERPL-MCNC: 9 MG/DL (ref 5–25)
CALCIUM SERPL-MCNC: 8.8 MG/DL (ref 8.4–10.2)
CHLORIDE SERPL-SCNC: 107 MMOL/L (ref 97–108)
CHOLEST SERPL-MCNC: 143 MG/DL
CO2 SERPL-SCNC: 27 MMOL/L (ref 22–30)
CREAT SERPL-MCNC: 0.68 MG/DL (ref 0.7–1.5)
GFR SERPL CREATININE-BSD FRML MDRD: 99 ML/MIN/1.73SQ M
GLUCOSE P FAST SERPL-MCNC: 80 MG/DL (ref 70–99)
GLUCOSE SERPL-MCNC: 80 MG/DL (ref 70–99)
HDLC SERPL-MCNC: 47 MG/DL
LDLC SERPL CALC-MCNC: 72 MG/DL
NONHDLC SERPL-MCNC: 96 MG/DL
P AXIS: 50 DEGREES
POTASSIUM SERPL-SCNC: 4.2 MMOL/L (ref 3.6–5)
PR INTERVAL: 178 MS
QRS AXIS: -54 DEGREES
QRSD INTERVAL: 118 MS
QT INTERVAL: 434 MS
QTC INTERVAL: 454 MS
SODIUM SERPL-SCNC: 137 MMOL/L (ref 137–147)
T WAVE AXIS: 17 DEGREES
TRIGL SERPL-MCNC: 119 MG/DL
VENTRICULAR RATE: 66 BPM

## 2020-03-18 PROCEDURE — 87081 CULTURE SCREEN ONLY: CPT | Performed by: PSYCHIATRY & NEUROLOGY

## 2020-03-18 PROCEDURE — 99221 1ST HOSP IP/OBS SF/LOW 40: CPT | Performed by: PSYCHIATRY & NEUROLOGY

## 2020-03-18 PROCEDURE — 80061 LIPID PANEL: CPT | Performed by: FAMILY MEDICINE

## 2020-03-18 PROCEDURE — 80048 BASIC METABOLIC PNL TOTAL CA: CPT | Performed by: FAMILY MEDICINE

## 2020-03-18 PROCEDURE — 93010 ELECTROCARDIOGRAM REPORT: CPT | Performed by: INTERNAL MEDICINE

## 2020-03-18 RX ADMIN — HALOPERIDOL LACTATE 5 MG: 5 INJECTION, SOLUTION INTRAMUSCULAR at 13:24

## 2020-03-18 RX ADMIN — OLANZAPINE 7.5 MG: 2.5 TABLET, FILM COATED ORAL at 21:43

## 2020-03-18 NOTE — NURSING NOTE
Pt pleasant in am with good appetite  Pt mumbling to self in his room much of shift  Gait steady  VSS  Pt signed 72 hour notice at [phone], Dr Mina Sepulveda notified  Pt more irritable as day progressed and began cursing and making derogatory comment toward staff from his room  Nasal swab obtained and sent  Pt then began closing his door completely and when redirected became loud, hostile and threatening  Pt refused po haldol prn, accepted  haldol 5 mg IM at 1324 while pretending he was asleep with positive effect  Monitored for safety and support

## 2020-03-18 NOTE — TREATMENT TEAM
Pt did not attend any groups when prompted or offered        03/18/20 1000   Activity/Group Checklist   Group Other (Comment)  (short term probelm solving)   Attendance Did not attend

## 2020-03-18 NOTE — H&P
[address]  Inpatient Geriatric Psychiatry  Psychiatrists Admission Evaluation      Patient Name: Kassi Barber  MRN: [id number]  DOS: 03/18/20     Chief Complaint:  psychosis, agitation    (Source of information: patient refused assessment; chart reviewed)    HPI: Kassi Barber  is a 81 y o  male with unknown prior psychiatric treatment who was bib police on a 436 and was activated by an acquaintance  He was eventually given the option of signing in for voluntary treatment which he did  The initial 302 states the petitioner has known him for 4 years, that he has been concerned over the past month regarding his ability to care for himself, has been delusional and aggressive  At this time patient refuses to cooperate with assessment and only states that he wishes to be discharged  Soon after this brief interaction, he got hostile, agitated, was yelling about leaving, hurling insults at staff, trying to push on the doors to leave  Security had to be called and he was given haldol 5mg IM  Staff have been noticing he is talking to himself while he is in his room alone  On review of ED records, he presented as unkempt, smelling of urine and feces, appearing malnourished, stating that "he refuses to get on that spacecraft," that "he hopes a car will hit and kill him " He arrived to the ED in restraints and remained agitated until he was medicated with ativan 1mg IM and geodon 20mg IM  ED crisis worker note states patient reported hearing voices, that he has been homeless  On arrival he reportedly had a small pocket knife on him  He mention to admitting nurse yeaterday that he has been having financial problems  He was reportedly oriented to place but not to time - could not identify month  He signed a 72 hour notice this morning  Additional historical and social information below was obtained by case management       Other psychiatric review of systems:  - unable to ascertain    PPHx:    1  Onset/Prior Diagnoses:   - unable to ascertain  2  Current and past outpatient psych treatment:                - some vague prior history  3  Inpatient hospitalizations:                - unable to ascertain       4  Medication trials in the past (including Family Hx):                - unable to ascertain from patient   - chart review suggests trials of bupropion, lexapro, prozac  5  Suicide attempts:                - unable to ascertain  6   Substance use:   - denies to     PMHx/PSHx: unable     Medications:   Medications Prior to Admission   Medication Sig Dispense Refill Last Dose    amLODIPine (NORVASC) 5 mg tablet Take 5 mg by mouth daily   Not Taking at Unknown time    buPROPion (WELLBUTRIN) 100 mg tablet Take 50 mg by mouth 2 (two) times a day   Not Taking at Unknown time    carisoprodol (SOMA) 350 mg tablet Take 350 mg by mouth 2 (two) times a day as needed  0 Not Taking at Unknown time    docusate sodium (COLACE) 100 mg capsule Take 1 capsule by mouth 2 (two) times a day (Patient not taking: Reported on 3/17/2020) 20 capsule 0 Not Taking at Unknown time    escitalopram (LEXAPRO) 20 mg tablet Take 50 mg by mouth 2 (two) times a day   Not Taking at Unknown time    FLUoxetine (PROzac) 10 mg capsule Take 10 mg by mouth daily   Not Taking at Unknown time    methocarbamol (ROBAXIN) 750 mg tablet Take 750 mg by mouth 2 (two) times a day   Not Taking at Unknown time    Naproxen Sodium (ALEVE) 220 MG CAPS Take by mouth every 12 (twelve) hours   Not Taking at Unknown time    omeprazole (PriLOSEC) 20 mg delayed release capsule Take 25 mg by mouth 2 (two) times a day   Not Taking at Unknown time    pravastatin (PRAVACHOL) 20 mg tablet Take 20 mg by mouth daily   Not Taking at Unknown time                   Allergies: unable to ascertain    Social History:  -Domicile status: homeless  -Support system: denies supports; never , no children  -Education/Employment: HS grad, unemployed; 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; $1000/mo from [employer] but card stopped working a month ago    -Trauma: unable to ascertain   -Legal:  Unable to ascertain    Family history: unable to ascertain    Examination:  Physical exam performed by hospitalist has been reviewed  Vitals:    03/18/20 0658   BP: 117/57   Pulse: (!) 51   Resp: 16   Temp: 98 2 °F (36 8 °C)   SpO2: 96%       Mental Status Exam [Per above +]  Appearance: disheveled; thin; no abnormal involuntary movements noted; stable gait  Behavior: uncooperative, hostile  Speech: wnl  Mood: unable to ascertain  Affect: irritable, angry, labile  Thought process: unable to ascertain  Thought content: unable to ascertain  Perceptual disturbances: internally preoccupied; no spontaneous reports at this time of suicidality  Cognition: unable to ascertain  Insight: unable to ascertain  Judgement: unable to ascertain    Lab/work up:  CBC - wnl  CMP - wnl  TSH - wnl  UA - wnl  UDS -ve  BAL -ve   QTc 454    ASSESSMENT:     Axis I:  - Psychotic disorder (principal admission dx)  - H/o depression per chart review  Axis II:  - deferred  Axis III:  - HLD per chart  Strengths:  - stable gait  Weakness:  - uncooperative; limited social support      Plan:   1  Disposition: Patient meets criteria for acute inpatient treatment due to being a danger to self and others  Patient will be discharged when there is an absence of agitation, psychosis and SI [id number]  2  Legal status: voluntary but will petition 302  3  Psychopharmacologic interventions:  a  Start olanzapine 7 5mg po qhs - will likely require medication over objection  4  Medical comorbidities: Consult hospitalist for baseline admission assessment and follow up as needed   5  Work-up: monitor QTc  6  Other therapies: Individual/group/milieu therapy as appropriate  7   Social issues: Case management to arrange outpatient follow up  8  Precautions: Routine q7min checks, vitals qshizach Schmidt MD  03/18/20

## 2020-03-18 NOTE — TREATMENT TEAM
03/18/20 0800   Team Meeting   Meeting Type Daily Rounds   Team Members Present   Team Members Present Physician;Nurse;; Other (Discipline and Name)   Physician Team Member Dr Pauline Lewis Team Member [facility] Management Team Member Evelia Bauer    Other (Discipline and Name) Elizabeth      Pt discussed at treatment team today, new admission, medications will be reviewed and adjusted accordingly  Patient also signed 72 hour notice this morning

## 2020-03-18 NOTE — TREATMENT TEAM
03/18/20 1100   Activity/Group Checklist   Group Exercise   Attendance Refused   Interactions Other (Comment)  (Pt  replied "Go to [facility]" when asked to attend group )

## 2020-03-18 NOTE — NURSING NOTE
Patient in his bed all night  No signs of distress  Patient appeared to have slept all night  No needs expressed

## 2020-03-18 NOTE — NURSING NOTE
Patient was isolative to his room  He denied any needs  He is blunt and constricted with conversation  He is in his bed  Eyes closed no signs of distress

## 2020-03-18 NOTE — CASE MANAGEMENT
Met with patient 1:1 to initiate psychosocial assessment  Patient is irritable and argumentative at this time  Patient signed a 72 hour notice this morning and believes he is leaving today  His main concern at time of this encounter is getting his belongings and getting in touch with social security to find out why his debit card is not working  Patient stated he has no current psychiatrist or therapist  Patient reported he is currently homeless but has income for an efficiency apartment  Patient is refusing assistance with discharge planning at this time  Patient stated he has no informal or formal supports and does not care, stating "I like to be on my own, quiet is good " Patient reported his highest level of education is 12th grade  Marital status- single- never  and no children  Patient stated he 'maybe at one point' had a therapist and psychiatrist' but is not sure  Patient's PCP is Dr Wm Felix- DOMINGA not signed  UDS is negative  Patient denies current and past D&A abuse  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, including painting and maintenance  Patient reports he gets 1000/m of SS but that his SS card stopped working a month ago and he is unsure why  Patient also denies access to firearms and legal issues  Patient answered questions appropriately until he broached the issue with social security again and became agitated upon realizing writer cannot make the phone call in his place and he may have to go to the [facility] office in person, depending upon what needs to be done  Patient became very agitated at this time and cursed at [address], abruptly leaving the room and ending the conversation  Patient was not open to further discussion or hearing of how writer can assist and support him  Due to the premature termination of the interview, [address] did not attain any releases of information, did not review IMM, and remaining admission tasks   Will follow up with attending psychiatrist as to the 72 hour notice previously signed and further discharge planning

## 2020-03-19 PROCEDURE — 99231 SBSQ HOSP IP/OBS SF/LOW 25: CPT | Performed by: PSYCHIATRY & NEUROLOGY

## 2020-03-19 PROCEDURE — 99232 SBSQ HOSP IP/OBS MODERATE 35: CPT | Performed by: NURSE PRACTITIONER

## 2020-03-19 RX ORDER — OLANZAPINE 10 MG/1
5 INJECTION, POWDER, LYOPHILIZED, FOR SOLUTION INTRAMUSCULAR
Status: DISCONTINUED | OUTPATIENT
Start: 2020-03-19 | End: 2020-03-22

## 2020-03-19 RX ORDER — OLANZAPINE 10 MG/1
10 TABLET ORAL
Status: DISCONTINUED | OUTPATIENT
Start: 2020-03-19 | End: 2020-03-22

## 2020-03-19 RX ADMIN — OLANZAPINE 10 MG: 10 TABLET, FILM COATED ORAL at 21:12

## 2020-03-19 NOTE — TREATMENT TEAM
03/19/20 0822   Team Meeting   Meeting Type Daily Rounds   Team Members Present   Team Members Present Physician;Nurse;;Occupational Therapist;Other (Discipline and Name)   Physician Team Member Marium Bertrand   Nursing Team Member [address] [facility] Adonis Burk, Replaced by [facility] Management Team Member Kevin Craig   OT Team Member Mane Farley   Other (Discipline and Name) Richelle Shafer; Pharmacist, Emery Hand     Reviewed case with team  Pt was talking to himself yesterday  Pt will be 302'd today

## 2020-03-19 NOTE — CASE MANAGEMENT
2 Doc 302 completed and faxed into [facility]  Patient given bill of rights and shown paperwork with security present  Patient insistent that he is leaving and there "nothing wrong with me " Patient reports he is "getting out one way or another " Patient continues to be hostile, evasive, and augmentative  Patient refused to speak with writer further to complete psychosocial assessment and replied with, "kindly fuck off " CM will continue to follow and provide services as needed

## 2020-03-19 NOTE — NURSING NOTE
Patient was transferred from   He is isolative in his room, but pleasant and cooperative  He comes out for meals but doesn't interact   He is fixated on going home   Denies SI

## 2020-03-19 NOTE — CONSULTS
I was asked by Dr Jose Luis Jackson to see the patient for the purpose of second opinion regarding involuntary commitment and also medications over objection  The patient was interviewed, chart reviewed and discussed with treatment team   He is quite hostile and dismissive and uncooperative using curse words indicating that there is nothing wrong with him and he did not believe that he was at [facility] on the psychiatric unit  He indicated that he had to leave right away and would not indicate where he wanted to go  His personal hygiene appeared to be poor  His voice was loud and appeared to be angry  He has been noted to be responding to internal stimuli  It is my opinion that this patient is not likely to respond to nonpharmacological treatments and he needs pharmacotherapy for control of his symptoms  Without such treatment, he poses a threat to himself and others    Medications should be forced against his will if he refuses oral medications

## 2020-03-19 NOTE — TREATMENT TEAM
Pt  Refused to attend groups       03/19/20 1400   Activity/Group Checklist   Group Life Skills   Attendance Refused

## 2020-03-19 NOTE — NURSING NOTE
Patient in dayroom for meals  Patient non-social with others, however is not verbalizing any symptoms and states he does not need anything at this time  Patient ate meals and was compliant with wrapping elbow to be in the dayroom

## 2020-03-19 NOTE — NURSING NOTE
Patient asking when he can go home, specifically asking "when are the papers getting here, i'd like to go"   Patient otherwise calm and re-directable, however also indifferent

## 2020-03-19 NOTE — PROGRESS NOTES
Progress Note - Lakeisha Og [address]  81 y o  male MRN: [id number]  Unit/Bed#: Ara Thayer 684-41 Encounter: [id number]    The patient was seen for continuing care and reviewed with treatment team   Staff reports the patient has been disorganized, agitated and angry at times  He has been observed talking to himself and appears to be responding to internal stimuli  He received IM Haldol yesterday for increased agitation  Last evening he was angry and wanted to leave but was redirectable  He is currently lying in his bed  He is irritable on approach and says he just wants to rest and then he put his blanket over his head  He refused to be interviewed  We have obtained 2 physician opinions to force medications over objection  Current Mental Status Evaluation:  Appearance:  disheveled   Behavior:  Dismissive and Uncooperative   Mood:  irritable   Affect: labile   Speech: Sparse   Thought Process:  Unable to assess due to scant verbalization   Thought Content:  Cannot be assessed due to patient factors   Perceptual Disturbances: Uncertain   Risk Potential: Unable to assess due to patient factors   Orientation:   Unable to assess due to patient factors     Progress Toward Goals:  No change  Principal Problem:    Suicidal ideation  Active Problems:    Medical clearance for psychiatric admission    Hyperlipidemia    GERD (gastroesophageal reflux disease)    Hypokalemia    Tobacco abuse      Recommended Treatment:     Increase Zyprexa to 10 mg HS p o  or will receive 5 mg IM if refuses    Continue with pharmacotherapy, group therapy, milieu therapy and occupational therapy    The patient will be maintained on the following medications:    Current Facility-Administered Medications:  aluminum-magnesium hydroxide-simethicone 30 mL Oral Q4H PRN Marilee Vizcaino MD   haloperidol 5 mg Oral Q6H PRN Marilee Vizcaino MD   Or       haloperidol lactate 5 mg Intramuscular Q6H PRN Marilee Vizcaino MD   hydrOXYzine HCL 25 mg Oral Q6H PRN Darin Lamb MD   influenza vaccine 0 5 mL Intramuscular Prior to discharge Darin Lamb MD   LORazepam 2 mg Intravenous Q6H PRN Darin Lamb MD   mirtazapine 7 5 mg Oral HS PRN Darin Lamb MD   nicotine polacrilex 2 mg Oral Q2H PRN Darin Lamb MD   OLANZapine 7 5 mg Oral HS Darin Lamb MD   polyethylene glycol 17 g Oral Daily PRN Darin Lamb MD

## 2020-03-20 PROBLEM — F29 PSYCHOTIC DISORDER (HCC): Status: ACTIVE | Noted: 2020-03-17

## 2020-03-20 LAB — MRSA NOSE QL CULT: NORMAL

## 2020-03-20 PROCEDURE — 99232 SBSQ HOSP IP/OBS MODERATE 35: CPT | Performed by: NURSE PRACTITIONER

## 2020-03-20 RX ADMIN — OLANZAPINE 10 MG: 10 TABLET, FILM COATED ORAL at 21:08

## 2020-03-20 RX ADMIN — LORAZEPAM 2 MG: 2 INJECTION INTRAMUSCULAR; INTRAVENOUS at 13:41

## 2020-03-20 RX ADMIN — HALOPERIDOL LACTATE 5 MG: 5 INJECTION, SOLUTION INTRAMUSCULAR at 13:41

## 2020-03-20 NOTE — PLAN OF CARE
Problem: Alteration in Thoughts and Perception  Goal: Treatment Goal: Gain control of psychotic behaviors/thinking, reduce/eliminate presenting symptoms and demonstrate improved reality functioning upon discharge  Outcome: Progressing  Goal: Verbalize thoughts and feelings  Description  Interventions:  - Promote a nonjudgmental and trusting relationship with the patient through active listening and therapeutic communication  - Assess patient's level of functioning, behavior and potential for risk  - Engage patient in 1 on 1 interactions  - Encourage patient to express fears, feelings, frustrations, and discuss symptoms    - [place] patient to reality, help patient recognize reality-based thinking   - Administer medications as ordered and assess for potential side effects  - Provide the patient education related to the signs and symptoms of the illness and desired effects of prescribed medications  Outcome: Progressing  Goal: Refrain from acting on delusional thinking/internal stimuli  Description  Interventions:  - Monitor patient closely, per order   - Utilize least restrictive measures   - Set reasonable limits, give positive feedback for acceptable   - Administer medications as ordered and monitor of potential side effects  Outcome: Progressing  Goal: Agree to be compliant with medication regime, as prescribed and report medication side effects  Description  Interventions:  - Offer appropriate PRN medication and supervise ingestion; conduct AIMS, as needed   Outcome: Progressing  Goal: Attend and participate in unit activities, including therapeutic, recreational, and educational groups  Description  Interventions:  -Encourage Visitation and family involvement in care  Outcome: Progressing  Goal: Recognize dysfunctional thoughts, communicate reality-based thoughts at the time of discharge  Description  Interventions:  - Provide medication and psycho-education to assist patient in compliance and developing insight into his/her illness   Outcome: Progressing  Goal: Complete daily ADLs, including personal hygiene independently, as able  Description  Interventions:  - Observe, teach, and assist patient with ADLS  - Monitor and promote a balance of rest/activity, with adequate nutrition and elimination   Outcome: Progressing     Problem: Ineffective Coping  Goal: Cooperates with admission process  Description  Interventions:   - Complete admission process  Outcome: Progressing  Goal: Identifies ineffective coping skills  Outcome: Progressing  Goal: Identifies healthy coping skills  Outcome: Progressing  Goal: Patient/Family participate in treatment and DC plans  Description  Interventions:  - Provide therapeutic environment  Outcome: Progressing  Goal: Understands least restrictive measures  Description  Interventions:  - Utilize least restrictive behavior  Outcome: Progressing  Goal: Free from restraint events  Description  - Utilize least restrictive measures   - Provide behavioral interventions   - Redirect inappropriate behaviors   Outcome: Progressing     Problem: Risk for Self Injury/Neglect  Goal: Treatment Goal: Remain safe during length of stay, learn and adopt new coping skills, and be free of self-injurious ideation, impulses and acts at the time of discharge  Outcome: Progressing  Goal: Verbalize thoughts and feelings  Description  Interventions:  - Assess and re-assess patient's lethality and potential for self-injury  - Engage patient in 1:1 interactions, daily, for a minimum of 15 minutes  - Encourage patient to express feelings, fears, frustrations, hopes  - Establish rapport/trust with patient   Outcome: Progressing  Goal: Refrain from harming self  Description  Interventions:  - Monitor patient closely, per order  - Develop a trusting relationship  - Supervise medication ingestion, monitor effects and side effects   Outcome: Progressing  Goal: Complete daily ADLs, including personal hygiene independently, as able  Description  Interventions:  - Observe, teach, and assist patient with ADLS  - Monitor and promote a balance of rest/activity, with adequate nutrition and elimination  Outcome: Progressing     Problem: Depression  Goal: Treatment Goal: Demonstrate behavioral control of depressive symptoms, verbalize feelings of improved mood/affect, and adopt new coping skills prior to discharge  Outcome: Progressing  Goal: Verbalize thoughts and feelings  Description  Interventions:  - Assess and re-assess patient's level of risk   - Engage patient in 1:1 interactions, daily, for a minimum of 15 minutes   - Encourage patient to express feelings, fears, frustrations, hopes   Outcome: Progressing  Goal: Refrain from harming self  Description  Interventions:  - Monitor patient closely, per order   - Supervise medication ingestion, monitor effects and side effects   Outcome: Progressing  Goal: Refrain from isolation  Description  Interventions:  - Develop a trusting relationship   - Encourage socialization   Outcome: Progressing  Goal: Refrain from self-neglect  Outcome: Progressing  Goal: Attend and participate in unit activities, including therapeutic, recreational, and educational groups  Description  Interventions:  - Provide therapeutic and educational activities daily, encourage attendance and participation, and document same in the medical record   Outcome: Progressing  Goal: Complete daily ADLs, including personal hygiene independently, as able  Description  Interventions:  - Observe, teach, and assist patient with ADLS  -  Monitor and promote a balance of rest/activity, with adequate nutrition and elimination   Outcome: Progressing     Problem: Anxiety  Goal: Anxiety is at manageable level  Description  Interventions:  - Assess and monitor patient's anxiety level     - Monitor for signs and symptoms (heart palpitations, chest pain, shortness of breath, headaches, nausea, feeling jumpy, restlessness, irritable, apprehensive)  - Collaborate with interdisciplinary team and initiate plan and interventions as ordered    - [place] patient to unit/surroundings  - Explain treatment plan  - Encourage participation in care  - Encourage verbalization of concerns/fears  - Identify coping mechanisms  - Assist in developing anxiety-reducing skills  - Administer/offer alternative therapies  - Limit or eliminate stimulants  Outcome: Progressing     Problem: Alteration in Orientation  Goal: Treatment Goal: Demonstrate a reduction of confusion and improved orientation to person, place, time and/or situation upon discharge, according to optimum baseline/ability  Outcome: Progressing  Goal: Interact with staff daily  Description  Interventions:  - Assess and re-assess patient's level of orientation  - Engage patient in 1 on 1 interactions, daily, for a minimum of 15 minutes   - Establish rapport/trust with patient   Outcome: Progressing  Goal: Express concerns related to confused thinking related to:  Description  Interventions:  - Encourage patient to express feelings, fears, frustrations, hopes  - Assign consistent caregivers   - [place]/re-orient patient as needed  - Allow comfort items, as appropriate  - Provide visual cues, signs, etc    Outcome: Progressing  Goal: Allow medical examinations, as recommended  Description  Interventions:  - Provide physical/neurological exams and/or referrals, per provider   Outcome: Progressing  Goal: Cooperate with recommended testing/procedures  Description  Interventions:  - Determine need for ancillary testing  - Observe for mental status changes  - Implement falls/precaution protocol   Outcome: Progressing  Goal: Complete daily ADLs, including personal hygiene independently, as able  Description  Interventions:  - Observe, teach, and assist patient with ADLS  Outcome: Progressing

## 2020-03-20 NOTE — CMS CERTIFICATION NOTE
Recertification: Based upon physical, mental and social evaluations, I certify that inpatient psychiatric services continue to be medically necessary for this patient for a duration of 7 midnights for the treatment of  Psychotic disorder [facility])   Available alternative community resources still do not meet the patient's mental health care needs  I further attest that an established written individualized plan of care has been updated and is outlined in the patient's medical records

## 2020-03-20 NOTE — TREATMENT TEAM
03/20/20 0820   Team Meeting   Meeting Type Daily Rounds   Team Members Present   Team Members Present Physician;Nurse;;Occupational Therapist;Other (Discipline and Name)   Physician Team Member Giovanni Malone, [address]   Nursing Team Member [facility] Management Team Member Anne-Marie Santos   OT Team Member Brooklyn Lemus   Other (Discipline and Name) Bari Henry     Reviewed case with team  Pt has a service dog  Pt will not talk with anyone  303 Tuesday

## 2020-03-20 NOTE — NURSING NOTE
Patient restless and only slept briefly earlier in night  Awake 0200 swearing, loud and pressured about people opening his door  Attempted to explain that we were just checking to make sure he was okay but became more agitated and yelling saying he was calling his   Patient out in allred 0300 again yelling at staff that he is tired of people looking in his door; unable to explain rounding due to constant yelling over staff  Patient took chair in allred and sat outside room 606, swearing and yelling that he is going to call his  to get him out of here  Also refusing to go to his room because a patient keeps opening his door and he will not go to bed if she is in hallway  After several requests, patient went to room and slammed door shut
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
## 2020-03-20 NOTE — TREATMENT PLAN
TREATMENT PLAN REVIEW - [id number] [facility]  81 y o  1950 male MRN: [id number]    [address] Room / Bed: Ara Thayer [facility] 521-85 Encounter: [id number]          Admit Date/Time:  3/17/2020  4:03 PM    Treatment Team: Attending Provider: Marilee Vizcaino MD; Consulting Physician: Dacia Salter MD; Care Coordinator: June Chan; Patient Care Assistant: Jorge Alberto Chapa; Patient Care Technician: Yonny Cotto; Registered Nurse: Natalie Bruno RN; : Iram Boykin; Occupational Therapy Assistant: Paresh James [place]; Occupational Therapist: Leopoldo Martin OT; Patient Care Assistant: More Rosen    Diagnosis: Principal Problem:    Psychotic disorder [facility])  Active Problems:    Medical clearance for psychiatric admission    Hyperlipidemia    GERD (gastroesophageal reflux disease)    Hypokalemia    Tobacco abuse      Patient Strengths/Assets: capable of independent living    Patient Barriers/Limitations: noncompliant with treatment    Short Term Goals: decrease in psychotic symptoms    Long Term Goals: resolution of psychotic symptoms    Progress Towards Goals: starting psychiatric medications as prescribed    Recommended Treatment: medication management, patient medication education, group therapy, milieu therapy, continued Behavioral Health psychiatric evaluation/assessment process    Treatment Frequency: daily medication monitoring, group and milieu therapy daily, monitoring through interdisciplinary rounds, monitoring through weekly patient care conferences    Expected Discharge Date:   In 7 days    Discharge Plan: discharge to home    Treatment Plan Created/Updated By: Marilee Vizcaino MD

## 2020-03-20 NOTE — OCCUPATIONAL THERAPY NOTE
Occupational Therapy  Rukhsana Shepard was approached for OT assessment  He declined to talk at this time  This writer will approach him at a later time when he is hopefully more willing to engage in the OT assessment      Hayley Melendrez, OT

## 2020-03-20 NOTE — NURSING NOTE
Patient given PRN haldol and ativan per Dr Amaro Nab   Patient escorted to bed by security and patient allowed medication to be administered

## 2020-03-20 NOTE — NURSING NOTE
Patient restless and awake most of night  Slept approximately 2 hours  Continues to be irritable when staff looks in door

## 2020-03-20 NOTE — NURSING NOTE
Patient behavior escalating due to wanting to be released immediately  Patient getting irate with staff, making statements like  [facility], you can't fucking hold me here" Patient repeating statements of wanting to leave

## 2020-03-20 NOTE — TREATMENT TEAM
03/20/20 1000 03/20/20 1100 03/20/20 1400   Activity/Group Checklist   Group Community meeting Other (Comment)  (mh education: recovery and hope) Life Skills   Attendance Did not attend; Refused Did not attend Refused

## 2020-03-20 NOTE — NURSING NOTE
Patient spent most of evening sitting in room  Appeared to be responding to internal stimuli but patient denies [address]  Patient focused on discharge when attempting to have conversation  Kept asking if his discharge papers were written because he was told he could leave tonight  No agitation or arguing when told discharge was not written; however, refused to answer any other questions  Patient did come out briefly to day room but does not interact with staff or peers

## 2020-03-20 NOTE — PROGRESS NOTES
03/20/20 1517   Team Meeting   Meeting Type Tx Team Meeting   Initial Conference Date 03/20/20   Team Members Present   Team Members Present Physician;Nurse;   Physician Team Member [facility]   Nursing Team Member [facility]   Care Management Team Member  [facility]   Patient/Family Present   Patient Present Yes   Patient's Family Present No     Patient refused

## 2020-03-20 NOTE — NURSING NOTE
Patient pacing in hallway  When asked if he wanted to take a nap until dinner, patient stated "Yeah, sure I'll lie down" and walked to his room

## 2020-03-21 PROCEDURE — 99232 SBSQ HOSP IP/OBS MODERATE 35: CPT | Performed by: PSYCHIATRY & NEUROLOGY

## 2020-03-21 RX ADMIN — OLANZAPINE 10 MG: 10 TABLET, FILM COATED ORAL at 21:49

## 2020-03-21 NOTE — NURSING NOTE
Pt has been asleep in bed all evening  He was awoken for hs medications and was compliant  Declined to talk at this time  Pt returned to bed and is currently sleeping

## 2020-03-21 NOTE — PLAN OF CARE
Problem: Alteration in Thoughts and Perception  Goal: Treatment Goal: Gain control of psychotic behaviors/thinking, reduce/eliminate presenting symptoms and demonstrate improved reality functioning upon discharge  Outcome: Progressing  Goal: Verbalize thoughts and feelings  Description  Interventions:  - Promote a nonjudgmental and trusting relationship with the patient through active listening and therapeutic communication  - Assess patient's level of functioning, behavior and potential for risk  - Engage patient in 1 on 1 interactions  - Encourage patient to express fears, feelings, frustrations, and discuss symptoms    - [place] patient to reality, help patient recognize reality-based thinking   - Administer medications as ordered and assess for potential side effects  - Provide the patient education related to the signs and symptoms of the illness and desired effects of prescribed medications  Outcome: Progressing  Goal: Refrain from acting on delusional thinking/internal stimuli  Description  Interventions:  - Monitor patient closely, per order   - Utilize least restrictive measures   - Set reasonable limits, give positive feedback for acceptable   - Administer medications as ordered and monitor of potential side effects  Outcome: Progressing  Goal: Agree to be compliant with medication regime, as prescribed and report medication side effects  Description  Interventions:  - Offer appropriate PRN medication and supervise ingestion; conduct AIMS, as needed   Outcome: Progressing  Goal: Attend and participate in unit activities, including therapeutic, recreational, and educational groups  Description  Interventions:  -Encourage Visitation and family involvement in care  Outcome: Progressing  Goal: Recognize dysfunctional thoughts, communicate reality-based thoughts at the time of discharge  Description  Interventions:  - Provide medication and psycho-education to assist patient in compliance and developing insight into his/her illness   Outcome: Progressing  Goal: Complete daily ADLs, including personal hygiene independently, as able  Description  Interventions:  - Observe, teach, and assist patient with ADLS  - Monitor and promote a balance of rest/activity, with adequate nutrition and elimination   Outcome: Progressing     Problem: Alteration in Thoughts and Perception  Goal: Verbalize thoughts and feelings  Description  Interventions:  - Promote a nonjudgmental and trusting relationship with the patient through active listening and therapeutic communication  - Assess patient's level of functioning, behavior and potential for risk  - Engage patient in 1 on 1 interactions  - Encourage patient to express fears, feelings, frustrations, and discuss symptoms    - [place] patient to reality, help patient recognize reality-based thinking   - Administer medications as ordered and assess for potential side effects  - Provide the patient education related to the signs and symptoms of the illness and desired effects of prescribed medications  Outcome: Progressing     Problem: Ineffective Coping  Goal: Cooperates with admission process  Description  Interventions:   - Complete admission process  Outcome: Progressing  Goal: Identifies ineffective coping skills  Outcome: Progressing  Goal: Identifies healthy coping skills  Outcome: Progressing  Goal: Demonstrates healthy coping skills  Outcome: Progressing  Goal: Participates in unit activities  Description  Interventions:  - Provide therapeutic environment   - Provide required programming   - Redirect inappropriate behaviors   Outcome: Progressing  Goal: Patient/Family participate in treatment and DC plans  Description  Interventions:  - Provide therapeutic environment  Outcome: Progressing  Goal: Patient/Family verbalizes awareness of resources  Outcome: Progressing  Goal: Understands least restrictive measures  Description  Interventions:  - Utilize least restrictive behavior  Outcome: Progressing  Goal: Free from restraint events  Description  - Utilize least restrictive measures   - Provide behavioral interventions   - Redirect inappropriate behaviors   Outcome: Progressing     Problem: Risk for Self Injury/Neglect  Goal: Treatment Goal: Remain safe during length of stay, learn and adopt new coping skills, and be free of self-injurious ideation, impulses and acts at the time of discharge  Outcome: Progressing  Goal: Verbalize thoughts and feelings  Description  Interventions:  - Assess and re-assess patient's lethality and potential for self-injury  - Engage patient in 1:1 interactions, daily, for a minimum of 15 minutes  - Encourage patient to express feelings, fears, frustrations, hopes  - Establish rapport/trust with patient   Outcome: Progressing  Goal: Refrain from harming self  Description  Interventions:  - Monitor patient closely, per order  - Develop a trusting relationship  - Supervise medication ingestion, monitor effects and side effects   Outcome: Progressing  Goal: Attend and participate in unit activities, including therapeutic, recreational, and educational groups  Description  Interventions:  - Provide therapeutic and educational activities daily, encourage attendance and participation, and document same in the medical record  - Obtain collateral information, encourage visitation and family involvement in care   Outcome: Progressing  Goal: Recognize maladaptive responses and adopt new coping mechanisms  Outcome: Progressing  Goal: Complete daily ADLs, including personal hygiene independently, as able  Description  Interventions:  - Observe, teach, and assist patient with ADLS  - Monitor and promote a balance of rest/activity, with adequate nutrition and elimination  Outcome: Progressing     Problem: Depression  Goal: Treatment Goal: Demonstrate behavioral control of depressive symptoms, verbalize feelings of improved mood/affect, and adopt new coping skills prior to discharge  Outcome: Progressing  Goal: Verbalize thoughts and feelings  Description  Interventions:  - Assess and re-assess patient's level of risk   - Engage patient in 1:1 interactions, daily, for a minimum of 15 minutes   - Encourage patient to express feelings, fears, frustrations, hopes   Outcome: Progressing  Goal: Refrain from harming self  Description  Interventions:  - Monitor patient closely, per order   - Supervise medication ingestion, monitor effects and side effects   Outcome: Progressing  Goal: Refrain from isolation  Description  Interventions:  - Develop a trusting relationship   - Encourage socialization   Outcome: Progressing  Goal: Refrain from self-neglect  Outcome: Progressing  Goal: Attend and participate in unit activities, including therapeutic, recreational, and educational groups  Description  Interventions:  - Provide therapeutic and educational activities daily, encourage attendance and participation, and document same in the medical record   Outcome: Progressing  Goal: Complete daily ADLs, including personal hygiene independently, as able  Description  Interventions:  - Observe, teach, and assist patient with ADLS  -  Monitor and promote a balance of rest/activity, with adequate nutrition and elimination   Outcome: Progressing     Problem: Alteration in Orientation  Goal: Treatment Goal: Demonstrate a reduction of confusion and improved orientation to person, place, time and/or situation upon discharge, according to optimum baseline/ability  Outcome: Progressing  Goal: Interact with staff daily  Description  Interventions:  - Assess and re-assess patient's level of orientation  - Engage patient in 1 on 1 interactions, daily, for a minimum of 15 minutes   - Establish rapport/trust with patient   Outcome: Progressing  Goal: Express concerns related to confused thinking related to:  Description  Interventions:  - Encourage patient to express feelings, fears, frustrations, hopes  - Assign consistent caregivers   - [place]/re-orient patient as needed  - Allow comfort items, as appropriate  - Provide visual cues, signs, etc    Outcome: Progressing  Goal: Allow medical examinations, as recommended  Description  Interventions:  - Provide physical/neurological exams and/or referrals, per provider   Outcome: Progressing  Goal: Cooperate with recommended testing/procedures  Description  Interventions:  - Determine need for ancillary testing  - Observe for mental status changes  - Implement falls/precaution protocol   Outcome: Progressing  Goal: Attend and participate in unit activities, including therapeutic, recreational, and educational groups  Description  Interventions:  - Provide therapeutic and educational activities daily, encourage attendance and participation, and document same in the medical record   - Provide appropriate opportunities for reminiscence   - Provide a consistent daily routine   - Encourage family contact/visitation   Outcome: Progressing  Goal: Complete daily ADLs, including personal hygiene independently, as able  Description  Interventions:  - Observe, teach, and assist patient with ADLS  Outcome: Progressing     Problem: DISCHARGE PLANNING - CARE MANAGEMENT  Goal: Discharge to post-acute care or home with appropriate resources  Description  INTERVENTIONS:  - Conduct assessment to determine patient/family and health care team treatment goals, and need for post-acute services based on payer coverage, community resources, and patient preferences, and barriers to discharge  - Address psychosocial, clinical, and financial barriers to discharge as identified in assessment in conjunction with the patient/family and health care team  - Arrange appropriate level of post-acute services according to patients   needs and preference and payer coverage in collaboration with the physician and health care team  - Communicate with and update the patient/family, physician, and health care team regarding progress on the discharge plan  - Arrange appropriate transportation to post-acute venues  Outcome: Progressing

## 2020-03-21 NOTE — NURSING NOTE
Patient pleasant and cooperative with the care  He is mostly isolative in his room  He wanted to nurse change his dressing on L elbow  He asked if he can be discharged today but when explained he accepted it  Denied Si,HI,AH

## 2020-03-22 PROCEDURE — 99232 SBSQ HOSP IP/OBS MODERATE 35: CPT | Performed by: PSYCHIATRY & NEUROLOGY

## 2020-03-22 RX ORDER — OLANZAPINE 10 MG/1
5 INJECTION, POWDER, LYOPHILIZED, FOR SOLUTION INTRAMUSCULAR
Status: DISCONTINUED | OUTPATIENT
Start: 2020-03-22 | End: 2020-03-23

## 2020-03-22 RX ADMIN — OLANZAPINE 15 MG: 5 TABLET, FILM COATED ORAL at 21:55

## 2020-03-22 NOTE — NURSING NOTE
Pt was seen in a unit, calm, a bit irritable last night  He denied symptoms, was compliant with HS med  He slept most of the night  Was seen up within night  Offered no complains

## 2020-03-22 NOTE — PROGRESS NOTES
[facility] [facility] Inpatient Geriatric Psychiatry  Psychiatrist's Progress Note    Patient Name: Mary Velazquez  MRN: [id number]  DOS: 03/22/20     Chief Complaint:  psychosis    Interval History: Per staff, patient slept  Has had some irritability  Patient remains guarded  He is less overtly paranoid but still quite mistrustful  He does not believe this writer when discussing the events leading up to his admission  He states the medical records are lies  He cannot recall other hospital encounters noted in his chart  He accuses this writer of being financially motivated in keeping him hospitalized  Despite this, he is slightly more cooperative though at times argumentative  He has not been hallucinating since last assessment  Medication compliant  Adverse effects of medications: denies      Mental Status Exam [Per above +]  Appearance: fair grooming; hygiene better; no abnormal involuntary movements noted; stable gait  Behavior: defensive, argumentative at times; no psychomotor retardation/agitation  Speech: mumbles at times; fluent  Mood: irritable  Affect: congruent, stable, constricted  Thought process: linear but vague  Thought content: paranoia remains but less severe  Perceptual disturbances: no internal preoccupations noted  Cognition: oriented to all domains     Insight: limited  Judgement: slightly improved      Current Facility-Administered Medications:     aluminum-magnesium hydroxide-simethicone (MYLANTA) 200-200-20 mg/5 mL oral suspension 30 mL, 30 mL, Oral, Q4H PRN, Roxane Grayson MD    haloperidol (HALDOL) tablet 5 mg, 5 mg, Oral, Q6H PRN **OR** haloperidol lactate (HALDOL) injection 5 mg, 5 mg, Intramuscular, Q6H PRN, Roxane Grayson MD, 5 mg at 03/20/20 1341    hydrOXYzine HCL (ATARAX) tablet 25 mg, 25 mg, Oral, Q6H PRN, Roxane Grayson MD    influenza vaccine, high-dose (FLUZONE HIGH-DOSE) IM injection TRISHA 0 5 mL, 0 5 mL, Intramuscular, Prior to discharge, Galo Esposito MD    LORazepam (ATIVAN) injection 2 mg, 2 mg, Intravenous, Q6H PRN, Galo Esposito MD, 2 mg at 03/20/20 1341    mirtazapine (REMERON) tablet 7 5 mg, 7 5 mg, Oral, HS PRN, Galo Esposito MD    nicotine polacrilex (NICORETTE) gum 2 mg, 2 mg, Oral, Q2H PRN, Galo Esposito MD    OLANZapine (ZyPREXA) tablet 15 mg, 15 mg, Oral, HS **OR** OLANZapine (ZyPREXA) IM injection 5 mg, 5 mg, Intramuscular, HS, Galo Esposito MD    polyethylene glycol (MIRALAX) packet 17 g, 17 g, Oral, Daily PRN, Galo Esposito MD    Vitals:    03/22/20 0809   BP: 107/62   Pulse: 60   Resp: 18   Temp: (!) 96 8 °F (36 °C)   SpO2: 98%       Lab/work up: EKG pending    Assessment:     Axis I:  - Psychotic disorder (HCC) (principle dx)  - h/o depression per chart review   Axis II:  -deferred  Axis III:  -HLD  Axis IV:  - possiblye homeless, unemployed, uncooperative    Progress: less agitated, still paranoid    Plan:   1  Disposition: Patient will be discharged to home vs shelter when there is an absence of psychosis and agitation [id number]  2  Legal status: 303 petitioned   3  Psychopharmacologic interventions:  - Continue olanzapine 15mg po qhs  - Continue to monitor psychosis  4  Medical comorbidities: Hospitalist following as needed  5  Other therapies: Individual/group/milieu therapy as appropriate  6  Psychoeducation: Discussed risks and benefits of medications at initiation of therapy; patient/family verbalized understanding and agreed with plan  7  Social issues: needs outpatient referral  8  Work up: check RPR, b12, folate  9   Precautions: Routine q7min checks, vitals qshift    Meme Alaniz MD  03/22/20

## 2020-03-22 NOTE — PLAN OF CARE
Problem: Alteration in Thoughts and Perception  Goal: Treatment Goal: Gain control of psychotic behaviors/thinking, reduce/eliminate presenting symptoms and demonstrate improved reality functioning upon discharge  Outcome: Progressing  Goal: Verbalize thoughts and feelings  Description  Interventions:  - Promote a nonjudgmental and trusting relationship with the patient through active listening and therapeutic communication  - Assess patient's level of functioning, behavior and potential for risk  - Engage patient in 1 on 1 interactions  - Encourage patient to express fears, feelings, frustrations, and discuss symptoms    - [place] patient to reality, help patient recognize reality-based thinking   - Administer medications as ordered and assess for potential side effects  - Provide the patient education related to the signs and symptoms of the illness and desired effects of prescribed medications  Outcome: Progressing  Goal: Refrain from acting on delusional thinking/internal stimuli  Description  Interventions:  - Monitor patient closely, per order   - Utilize least restrictive measures   - Set reasonable limits, give positive feedback for acceptable   - Administer medications as ordered and monitor of potential side effects  Outcome: Progressing  Goal: Agree to be compliant with medication regime, as prescribed and report medication side effects  Description  Interventions:  - Offer appropriate PRN medication and supervise ingestion; conduct AIMS, as needed   Outcome: Progressing  Goal: Recognize dysfunctional thoughts, communicate reality-based thoughts at the time of discharge  Description  Interventions:  - Provide medication and psycho-education to assist patient in compliance and developing insight into his/her illness   Outcome: Progressing  Goal: Complete daily ADLs, including personal hygiene independently, as able  Description  Interventions:  - Observe, teach, and assist patient with ADLS  - Monitor and promote a balance of rest/activity, with adequate nutrition and elimination   Outcome: Progressing     Problem: Ineffective Coping  Goal: Cooperates with admission process  Description  Interventions:   - Complete admission process  Outcome: Progressing  Goal: Identifies ineffective coping skills  Outcome: Progressing  Goal: Identifies healthy coping skills  Outcome: Progressing  Goal: Demonstrates healthy coping skills  Outcome: Progressing  Goal: Patient/Family participate in treatment and DC plans  Description  Interventions:  - Provide therapeutic environment  Outcome: Progressing  Goal: Patient/Family verbalizes awareness of resources  Outcome: Progressing  Goal: Understands least restrictive measures  Description  Interventions:  - Utilize least restrictive behavior  Outcome: Progressing  Goal: Free from restraint events  Description  - Utilize least restrictive measures   - Provide behavioral interventions   - Redirect inappropriate behaviors   Outcome: Progressing     Problem: Risk for Self Injury/Neglect  Goal: Treatment Goal: Remain safe during length of stay, learn and adopt new coping skills, and be free of self-injurious ideation, impulses and acts at the time of discharge  Outcome: Progressing  Goal: Verbalize thoughts and feelings  Description  Interventions:  - Assess and re-assess patient's lethality and potential for self-injury  - Engage patient in 1:1 interactions, daily, for a minimum of 15 minutes  - Encourage patient to express feelings, fears, frustrations, hopes  - Establish rapport/trust with patient   Outcome: Progressing  Goal: Refrain from harming self  Description  Interventions:  - Monitor patient closely, per order  - Develop a trusting relationship  - Supervise medication ingestion, monitor effects and side effects   Outcome: Progressing  Goal: Recognize maladaptive responses and adopt new coping mechanisms  Outcome: Progressing  Goal: Complete daily ADLs, including personal hygiene independently, as able  Description  Interventions:  - Observe, teach, and assist patient with ADLS  - Monitor and promote a balance of rest/activity, with adequate nutrition and elimination  Outcome: Progressing     Problem: Depression  Goal: Treatment Goal: Demonstrate behavioral control of depressive symptoms, verbalize feelings of improved mood/affect, and adopt new coping skills prior to discharge  Outcome: Progressing  Goal: Verbalize thoughts and feelings  Description  Interventions:  - Assess and re-assess patient's level of risk   - Engage patient in 1:1 interactions, daily, for a minimum of 15 minutes   - Encourage patient to express feelings, fears, frustrations, hopes   Outcome: Progressing  Goal: Refrain from harming self  Description  Interventions:  - Monitor patient closely, per order   - Supervise medication ingestion, monitor effects and side effects   Outcome: Progressing  Goal: Refrain from isolation  Description  Interventions:  - Develop a trusting relationship   - Encourage socialization   Outcome: Progressing  Goal: Refrain from self-neglect  Outcome: Progressing  Goal: Complete daily ADLs, including personal hygiene independently, as able  Description  Interventions:  - Observe, teach, and assist patient with ADLS  -  Monitor and promote a balance of rest/activity, with adequate nutrition and elimination   Outcome: Progressing     Problem: Anxiety  Goal: Anxiety is at manageable level  Description  Interventions:  - Assess and monitor patient's anxiety level  - Monitor for signs and symptoms (heart palpitations, chest pain, shortness of breath, headaches, nausea, feeling jumpy, restlessness, irritable, apprehensive)  - Collaborate with interdisciplinary team and initiate plan and interventions as ordered    - [place] patient to unit/surroundings  - Explain treatment plan  - Encourage participation in care  - Encourage verbalization of concerns/fears  - Identify coping mechanisms  - Assist in developing anxiety-reducing skills  - Administer/offer alternative therapies  - Limit or eliminate stimulants  Outcome: Progressing     Problem: Alteration in Orientation  Goal: Treatment Goal: Demonstrate a reduction of confusion and improved orientation to person, place, time and/or situation upon discharge, according to optimum baseline/ability  Outcome: Progressing  Goal: Interact with staff daily  Description  Interventions:  - Assess and re-assess patient's level of orientation  - Engage patient in 1 on 1 interactions, daily, for a minimum of 15 minutes   - Establish rapport/trust with patient   Outcome: Progressing  Goal: Express concerns related to confused thinking related to:  Description  Interventions:  - Encourage patient to express feelings, fears, frustrations, hopes  - Assign consistent caregivers   - [place]/re-orient patient as needed  - Allow comfort items, as appropriate  - Provide visual cues, signs, etc    Outcome: Progressing  Goal: Allow medical examinations, as recommended  Description  Interventions:  - Provide physical/neurological exams and/or referrals, per provider   Outcome: Progressing  Goal: Cooperate with recommended testing/procedures  Description  Interventions:  - Determine need for ancillary testing  - Observe for mental status changes  - Implement falls/precaution protocol   Outcome: Progressing  Goal: Complete daily ADLs, including personal hygiene independently, as able  Description  Interventions:  - Observe, teach, and assist patient with ADLS  Outcome: Progressing

## 2020-03-22 NOTE — PROGRESS NOTES
[facility] [facility] Inpatient Geriatric Psychiatry  Psychiatrist's Progress Note    Patient Name: Kassi Barber  MRN: [id number]  DOS: 03/21/20     Chief Complaint:  psychosis    Interval History: Per staff, patient was quiet and not really talking yesterday evening after he woke up from sedation  Patient was somnolent as well as apathetic this morning  He refused to wake for assessment stating he would like to rest  He was not hostile  Medication compliant       Adverse effects of medications: unable to assess      Mental Status Exam [Per above +]  Appearance: limited grooming; adequate hygiene; no abnormal involuntary movements noted  Behavior: apathetic, uncooperative with assessment though not hositle  Speech: mumbled  Mood: Unable to ascertain  Affect: limited by somnolence  Thought process: Unable to ascertain  Thought content: Unable to ascertain  Perceptual disturbances: limited by somnolence  Cognition: Unable to ascertain  Insight: poor  Judgement: poor      Current Facility-Administered Medications:     aluminum-magnesium hydroxide-simethicone (MYLANTA) 200-200-20 mg/5 mL oral suspension 30 mL, 30 mL, Oral, Q4H PRN, Charlee Bazzi MD    haloperidol (HALDOL) tablet 5 mg, 5 mg, Oral, Q6H PRN **OR** haloperidol lactate (HALDOL) injection 5 mg, 5 mg, Intramuscular, Q6H PRN, Charlee Bazzi MD, 5 mg at 03/20/20 1341    hydrOXYzine HCL (ATARAX) tablet 25 mg, 25 mg, Oral, Q6H PRN, Charlee Bazzi MD    influenza vaccine, high-dose (FLUZONE HIGH-DOSE) IM injection TRISHA 0 5 mL, 0 5 mL, Intramuscular, Prior to discharge, Charlee Bazzi MD    LORazepam (ATIVAN) injection 2 mg, 2 mg, Intravenous, Q6H PRN, Charlee Bazzi MD, 2 mg at 03/20/20 1341    mirtazapine (REMERON) tablet 7 5 mg, 7 5 mg, Oral, HS PRN, Charlee Bazzi MD    nicotine polacrilex (NICORETTE) gum 2 mg, 2 mg, Oral, Q2H PRN, Charlee Bazzi MD    OLANZapine (ZyPREXA) tablet 10 mg, 10 mg, Oral, HS, 10 mg at 03/21/20 2149 **OR** OLANZapine (ZyPREXA) IM injection 5 mg, 5 mg, Intramuscular, HS, PO [place]    polyethylene glycol (MIRALAX) packet 17 g, 17 g, Oral, Daily PRN, Miguel Davidson MD    Vitals:    03/21/20 2111   BP: 136/72   Pulse: 63   Resp: 16   Temp: 97 5 °F (36 4 °C)   SpO2: 98%       Lab/work up: none    Assessment:     Axis I:  - Psychotic disorder (HCC) (principle dx)  - h/o depression per chart review   Axis II:  -deferred  Axis III:  - HDL per chart  Axis IV:  - homeless, unemployed, uncooperative    Progress: was agitated yesterday afternoon requiring IM medications; now apathetic and uncooperative    Plan:   1  Disposition: Patient will be discharged to homeless shelter when there is an absence of psychosis [id number]  2  Legal status: 303 petitioned   3  Psychopharmacologic interventions:  - Increase olanzapine to 15mg po qhs (on meds over objection)  - Continue to monitor psychosis  4  Medical comorbidities: Hospitalist following as needed  5  Other therapies: Individual/group/milieu therapy as appropriate  6  Psychoeducation: Discussed risks and benefits of medications at initiation of therapy; patient/family verbalized understanding and agreed with plan  7  Social issues: case management to arrange outpatient follow up when stable  8  Work up: check RPR, monitor QTc  9   Precautions: Routine q7min checks, vitals qshift    Sarita Vogel MD  03/21/20

## 2020-03-22 NOTE — NURSING NOTE
Patient refused to speak with this writer  He appears confused and is asking to leave today  Patient is irritable and is currently sitting at the end of the hallway  He has not had any incidents of agitation

## 2020-03-23 LAB
FOLATE SERPL-MCNC: 6.6 NG/ML (ref 3.1–17.5)
VIT B12 SERPL-MCNC: 306 PG/ML (ref 100–900)

## 2020-03-23 PROCEDURE — 82746 ASSAY OF FOLIC ACID SERUM: CPT | Performed by: PSYCHIATRY & NEUROLOGY

## 2020-03-23 PROCEDURE — 97167 OT EVAL HIGH COMPLEX 60 MIN: CPT

## 2020-03-23 PROCEDURE — 99232 SBSQ HOSP IP/OBS MODERATE 35: CPT | Performed by: PSYCHIATRY & NEUROLOGY

## 2020-03-23 PROCEDURE — 82607 VITAMIN B-12: CPT | Performed by: PSYCHIATRY & NEUROLOGY

## 2020-03-23 PROCEDURE — 93005 ELECTROCARDIOGRAM TRACING: CPT

## 2020-03-23 PROCEDURE — 86592 SYPHILIS TEST NON-TREP QUAL: CPT | Performed by: PSYCHIATRY & NEUROLOGY

## 2020-03-23 RX ORDER — OLANZAPINE 10 MG/1
5 INJECTION, POWDER, LYOPHILIZED, FOR SOLUTION INTRAMUSCULAR
Status: DISCONTINUED | OUTPATIENT
Start: 2020-03-23 | End: 2020-03-24 | Stop reason: HOSPADM

## 2020-03-23 RX ORDER — OLANZAPINE 10 MG/1
20 TABLET ORAL
Status: DISCONTINUED | OUTPATIENT
Start: 2020-03-23 | End: 2020-03-24 | Stop reason: HOSPADM

## 2020-03-23 RX ADMIN — OLANZAPINE 20 MG: 10 TABLET, FILM COATED ORAL at 21:14

## 2020-03-23 NOTE — PROGRESS NOTES
Progress Note - Lakeisha Og 89  81 y o  male MRN: [id number]  Unit/Bed#: [facility] 929-32 Encounter: [id number]    The patient was seen for continuing care and reviewed with treatment team   Staff reports the patient has been confused at times and irritable  He has been refusing to have his EKG  He continues to respond to internal stimuli  On approach he is talking to himself and pacing the unit  He is dismissive and minimally cooperative with interview  He says he was hoping he could leave today  He said he needs to make some phone calls to figure out where to go after discharge  He was then calling local [facility] care [facility]  Current Mental Status Evaluation:  Appearance:  Poor eye contact   Behavior:  Dismissive   Mood:  Uncertain   Affect: constricted   Speech: Sparse   Thought Process:  Unable to assess due to scant verbalization   Thought Content:  Cannot be assessed due to patient factors   Perceptual Disturbances: Appears to be responding to internal stimuli   Risk Potential: Unable to assess due to patient factors   Orientation:   Unable to assess due to patient factors     Progress Toward Goals:  No significant changes  Principal Problem:    Psychotic disorder (HCC)  Active Problems:    Medical clearance for psychiatric admission    Hyperlipidemia    GERD (gastroesophageal reflux disease)    Hypokalemia    Tobacco abuse      Recommended Treatment:     Continue Zyprexa 15 mg HS  Continue with pharmacotherapy, group therapy, milieu therapy and occupational therapy    The patient will be maintained on the following medications:    Current Facility-Administered Medications:  aluminum-magnesium hydroxide-simethicone 30 mL Oral Q4H PRN Jackie Hui MD   haloperidol 5 mg Oral Q6H PRN Jackie Hui MD   Or       haloperidol lactate 5 mg Intramuscular Q6H PRN Jackie Hui MD   hydrOXYzine HCL 25 mg Oral Q6H PRN Jackie Hui MD   influenza vaccine 0 5 mL Intramuscular Prior to discharge Mahad MD Glory   LORazepam 2 mg Intravenous Q6H PRN Mahad MD Glory   mirtazapine 7 5 mg Oral HS PRN Mahad MD [place]   nicotine polacrilex 2 mg Oral Q2H PRN Mahad Minus, MD   OLANZapine 15 mg Oral HS Mahad MD [place]   Or       OLANZapine 5 mg Intramuscular HS Mahad Minus, MD   polyethylene glycol 17 g Oral Daily PRN Mahad MD [place]

## 2020-03-23 NOTE — CASE MANAGEMENT
CM spoke with patient regarding phone call received by Rosette Zepeda  Patient continues to decline to allow writer to call but did ask for Harinder's phone number and [facility] information  CM provided this to patient as well as advised him that it will be on his discharge paperwork  Patient previously provided patient with Dana's number as he did call her to check in on the dog  Patient reports he has "possible housing options" lined up however declined assistance from CM  Patient excited to leave tomorrow  CM will continue to follow and provide services as needed

## 2020-03-23 NOTE — TREATMENT TEAM
03/23/20 0817   Team Meeting   Meeting Type Daily Rounds   Team Members Present   Team Members Present Physician;Nurse;;Occupational Therapist;Other (Discipline and Name)   Physician Team Member Larry Land, [address]   Nursing Team Member [facility] Management Team Member Pascale Nieto   OT Team Member Tyshawn Urrutia   Other (Discipline and Name) Paloma Gibson     Reviewed case with team  Pt does not remember what happened up until this point  Pt is homeless  303 cancelled, D/C tomorrow

## 2020-03-23 NOTE — NURSING NOTE
Pt calm and cooperative with care  He denies all symptoms, but does report feeling "a little blue "  Pt compliant with L elbow wound dressing  No agitation or aggression noted  Pt visible in the milieu, but is isolative  Will continue to monitor

## 2020-03-23 NOTE — TREATMENT TEAM
03/23/20 1000 03/23/20 1400   Activity/Group Checklist   Group [employer] meeting Life Skills   Attendance Did not attend  (in assessment with OT) Refused   Interactions Other (Comment)  (pt pleasant when asked about his dog)  --    Affect/Mood Other (Comment); Incongruent  (Pt  fluctuating with irritability )  --

## 2020-03-23 NOTE — CASE MANAGEMENT
Bess Castillo [phone] called and expressed concern about pt and his current homeless status  Writer informed that we are unable to confirm pt's admission status, however, took his phone number, the name and number for [facility] where friends would like for pt to be referred, and Terra Bryant who is also concerned about pt  Info provided to pt's assigned CM who stated pt has previously declined to sign DOMINGA's or allow friends to be involved in care, however, she will check in with patient again

## 2020-03-23 NOTE — PLAN OF CARE
Problem: Alteration in Thoughts and Perception  Goal: Treatment Goal: Gain control of psychotic behaviors/thinking, reduce/eliminate presenting symptoms and demonstrate improved reality functioning upon discharge  Outcome: Progressing  Goal: Verbalize thoughts and feelings  Description  Interventions:  - Promote a nonjudgmental and trusting relationship with the patient through active listening and therapeutic communication  - Assess patient's level of functioning, behavior and potential for risk  - Engage patient in 1 on 1 interactions  - Encourage patient to express fears, feelings, frustrations, and discuss symptoms    - [place] patient to reality, help patient recognize reality-based thinking   - Administer medications as ordered and assess for potential side effects  - Provide the patient education related to the signs and symptoms of the illness and desired effects of prescribed medications  Outcome: Progressing  Goal: Refrain from acting on delusional thinking/internal stimuli  Description  Interventions:  - Monitor patient closely, per order   - Utilize least restrictive measures   - Set reasonable limits, give positive feedback for acceptable   - Administer medications as ordered and monitor of potential side effects  Outcome: Progressing  Goal: Agree to be compliant with medication regime, as prescribed and report medication side effects  Description  Interventions:  - Offer appropriate PRN medication and supervise ingestion; conduct AIMS, as needed   Outcome: Progressing  Goal: Recognize dysfunctional thoughts, communicate reality-based thoughts at the time of discharge  Description  Interventions:  - Provide medication and psycho-education to assist patient in compliance and developing insight into his/her illness   Outcome: Progressing  Goal: Complete daily ADLs, including personal hygiene independently, as able  Description  Interventions:  - Observe, teach, and assist patient with ADLS  - Monitor and promote a balance of rest/activity, with adequate nutrition and elimination   Outcome: Progressing     Problem: Ineffective Coping  Goal: Cooperates with admission process  Description  Interventions:   - Complete admission process  Outcome: Progressing  Goal: Identifies ineffective coping skills  Outcome: Progressing  Goal: Identifies healthy coping skills  Outcome: Progressing  Goal: Demonstrates healthy coping skills  Outcome: Progressing  Goal: Participates in unit activities  Description  Interventions:  - Provide therapeutic environment   - Provide required programming   - Redirect inappropriate behaviors   Outcome: Progressing  Goal: Patient/Family participate in treatment and DC plans  Description  Interventions:  - Provide therapeutic environment  Outcome: Progressing  Goal: Patient/Family verbalizes awareness of resources  Outcome: Progressing  Goal: Understands least restrictive measures  Description  Interventions:  - Utilize least restrictive behavior  Outcome: Progressing  Goal: Free from restraint events  Description  - Utilize least restrictive measures   - Provide behavioral interventions   - Redirect inappropriate behaviors   Outcome: Progressing     Problem: Risk for Self Injury/Neglect  Goal: Treatment Goal: Remain safe during length of stay, learn and adopt new coping skills, and be free of self-injurious ideation, impulses and acts at the time of discharge  Outcome: Progressing  Goal: Verbalize thoughts and feelings  Description  Interventions:  - Assess and re-assess patient's lethality and potential for self-injury  - Engage patient in 1:1 interactions, daily, for a minimum of 15 minutes  - Encourage patient to express feelings, fears, frustrations, hopes  - Establish rapport/trust with patient   Outcome: Progressing  Goal: Refrain from harming self  Description  Interventions:  - Monitor patient closely, per order  - Develop a trusting relationship  - Supervise medication ingestion, monitor effects and side effects   Outcome: Progressing  Goal: Recognize maladaptive responses and adopt new coping mechanisms  Outcome: Progressing  Goal: Complete daily ADLs, including personal hygiene independently, as able  Description  Interventions:  - Observe, teach, and assist patient with ADLS  - Monitor and promote a balance of rest/activity, with adequate nutrition and elimination  Outcome: Progressing     Problem: Depression  Goal: Treatment Goal: Demonstrate behavioral control of depressive symptoms, verbalize feelings of improved mood/affect, and adopt new coping skills prior to discharge  Outcome: Progressing  Goal: Verbalize thoughts and feelings  Description  Interventions:  - Assess and re-assess patient's level of risk   - Engage patient in 1:1 interactions, daily, for a minimum of 15 minutes   - Encourage patient to express feelings, fears, frustrations, hopes   Outcome: Progressing  Goal: Refrain from harming self  Description  Interventions:  - Monitor patient closely, per order   - Supervise medication ingestion, monitor effects and side effects   Outcome: Progressing  Goal: Refrain from isolation  Description  Interventions:  - Develop a trusting relationship   - Encourage socialization   Outcome: Progressing  Goal: Refrain from self-neglect  Outcome: Progressing  Goal: Complete daily ADLs, including personal hygiene independently, as able  Description  Interventions:  - Observe, teach, and assist patient with ADLS  -  Monitor and promote a balance of rest/activity, with adequate nutrition and elimination   Outcome: Progressing     Problem: Anxiety  Goal: Anxiety is at manageable level  Description  Interventions:  - Assess and monitor patient's anxiety level  - Monitor for signs and symptoms (heart palpitations, chest pain, shortness of breath, headaches, nausea, feeling jumpy, restlessness, irritable, apprehensive)     - Collaborate with interdisciplinary team and initiate plan and interventions as ordered    - [place] patient to unit/surroundings  - Explain treatment plan  - Encourage participation in care  - Encourage verbalization of concerns/fears  - Identify coping mechanisms  - Assist in developing anxiety-reducing skills  - Administer/offer alternative therapies  - Limit or eliminate stimulants  Outcome: Progressing     Problem: Alteration in Orientation  Goal: Treatment Goal: Demonstrate a reduction of confusion and improved orientation to person, place, time and/or situation upon discharge, according to optimum baseline/ability  Outcome: Progressing  Goal: Interact with staff daily  Description  Interventions:  - Assess and re-assess patient's level of orientation  - Engage patient in 1 on 1 interactions, daily, for a minimum of 15 minutes   - Establish rapport/trust with patient   Outcome: Progressing  Goal: Express concerns related to confused thinking related to:  Description  Interventions:  - Encourage patient to express feelings, fears, frustrations, hopes  - Assign consistent caregivers   - [place]/re-orient patient as needed  - Allow comfort items, as appropriate  - Provide visual cues, signs, etc    Outcome: Progressing  Goal: Allow medical examinations, as recommended  Description  Interventions:  - Provide physical/neurological exams and/or referrals, per provider   Outcome: Progressing  Goal: Cooperate with recommended testing/procedures  Description  Interventions:  - Determine need for ancillary testing  - Observe for mental status changes  - Implement falls/precaution protocol   Outcome: Progressing  Goal: Complete daily ADLs, including personal hygiene independently, as able  Description  Interventions:  - Observe, teach, and assist patient with ADLS  Outcome: Progressing     Problem: Alteration in Thoughts and Perception  Goal: Attend and participate in unit activities, including therapeutic, recreational, and educational groups  Description  Interventions:  -Encourage Visitation and family involvement in care  Outcome: Not Progressing

## 2020-03-23 NOTE — OCCUPATIONAL THERAPY NOTE
Occupational Therapy Evaluation      Homero Bridger     3/23/2020    Patient Active Problem List   Diagnosis    Abscess of left hand    Abscess of left forearm    Prostate disorder    Elevated PSA    Medical clearance for psychiatric admission    Hyperlipidemia    GERD (gastroesophageal reflux disease)    Hypokalemia    Psychotic disorder ([facility] [address]    Tobacco abuse       Past Medical History:   Diagnosis Date    Anxiety     Depression     GERD (gastroesophageal reflux disease)     Hyperlipidemia     Hypertension     Memory loss     MRSA (methicillin resistant staph aureus) culture positive     Psychosis ([facility] [address] )        Past Surgical History:   Procedure Laterality Date    HAND DEBRIDEMENT Left 1/19/2018    Procedure: DEBRIDEMENT HAND/FINGER [facility] OUT): left thumb - deep abscess measuring 3 cm x 1 cm x 1 cm;  Surgeon: Karla Paredes MD;  [facility];  Service: Orthopedics    WOUND DEBRIDEMENT Left 1/19/2018    Procedure: DEBRIDEMENT UPPER EXTREMITY [facility] OUT): left elbow deep abscess over the olecranon measuring 4 cm x 4 cm x 2 cm;  Surgeon: Karla Paredes MD;  [facility];  Service: Orthopedics       Subjective:  RE: reason for hospitalization: '"They came in the middle of the night and hauled me down here in the middle of the night  Rukhsana Shepard was very pleasant during the assessment  He denied need for hospitalization at this time  Per his record, he was noted to have bib police on a 671 commitment  This was activated by an acquaintance  The petitioner had shared concerns over patient's ability to care for himself and that he had been delusional and aggressive  He was noted to be unkempt in the ED, was smelling of urine and feces, stated that he refuses to get into that spacecraft and also hoped that a car would hit him, kill him          Prior Level of Function:  Rukhsana Shepard stated that prior this hospitalization, he was staying with a friend, but he stated that he is now in the process of trying to find a 1 bedroom apartment/ efficiency apartment  He did express concern over the prices of apartments  He is independent in ambulation  He reports independence in personal care  He stated that he has done such things as cook for himself (he stated that sometimes he just heats things up)  He stated that he does handle his own money, he receives social security, he stated that he has a debit card but did express concern that his card is defunct for now (he wanted to call social security to straighten this out)  He stated that he does his own laundry when he has to, has done his own housekeeping  He stated that he has a [place] but on occasion has taken a bus  He stated that he handles his own medications and takes this as prescribed  Falls:  He denies any falls in the past 6 months    Vision:  He was wearing glasses at time of interview  He stated that he wears glasses "most of the time"  Alert & Oriented   He was oriented to himself, place, time  He denies need for hospitalization and stated that he does not know why he is here  Hobbies/Interests:  He stated "Not really" when asked about hobbies  Support:   "Not right now"  He has been noted by  to report that he has declined to sing DOMINGA's or allow friends to be involved in his care (friend has even called hospital to express concern about patient)  Pain:  He denies any pain at this time  R UE AROM WFL's    LUE AROM WFL's        Current Level of Function:  He is independent in ambulation, personal care  He was pleasant in his interactions with this writer at this time, he did express that he does plan to seek independent living but is in the process of looking for something affordable  He did appear somewhat suspicious, he did decline writing task but did remain pleasant throughout the full interview process  His affect was often warm and friendly   Since his hospitalization, however, he has been inconsistently cooperative with staff and select procedures at times using profanity when declining  Work Task Skills:  Task Investment he was generally invested in the interview process, he carried out most tasks presented to him although he did decline writing task   Problem Solving he did notice errors on 1-2 step whip stitch task and was able to error correct these; he did notice errors on multistep single cordovan task but did require assistance to correct these errors  Concentration No difficulty   Follows Direction he was able to carry out 1-2 step verbal demonstrative instruction  Frustration Tolerance at time of interview his frustration tolerance was overall good, he remained pleasant and invested even with task challenges    Social Skills:  Dyadic Interaction (eye contact, makes needs known, goal directed conversation)  Eye contact: Good  Quality of Response: Clear and Concise  Goal Directed: Yes  False Beliefs: he did not verbalize specific false beliefs at this time, he did appear guarded, at times suspicious, i e , did not wish to complete writing task, did not wish to write down even his first name    Assessment performed:      Pt is a 81 y o  male seen for OT evaluation s/p admit to [facility] on 3/17/2020 w/ Psychotic disorder ([facility] [address] )  Comorbidities affecting pt's functional performance at time of assessment include: hyperlipidemia, GERD, hypokalemia, tobacco abuse, hx of prostate disorder  Personal factors affecting pt at time of IE include:limited home support, behavioral pattern, difficulty performing IADLS , limited insight into deficits, compliance, financial barriers, health management , environment, homeless and suspiciousness, decreased coping skills, decreased life management skills  Prior to admission, pt was living in a friend's home   Upon evaluation: Pt requires treatment with consideration of the following deficits impacting occupational performance: impaired problem solving, impulsivity, decreased safety awareness, decreased coping skills and decreased life management skills  From OT standpoint, recommendation at time of d/c would be to a supportive living environment (he is currently planning to go to an independent living situation but does not appear to have supports and is refusing to have concerned people/ friends involved in his care at this time)  Patient Goal:  "I hope to get out tomorrow "    Plan: He was encouraged to attend programs on the unit as he prepares to leave  He stated that he was not able to think of any group topics that might be helpful at this time       Group Recommendations:   Exercise  Relapse prevention  Coping skills  Healthy living  Life management  Stress management  Positive focus  Dealing with conflict  Managing mental health  Building supports    Joleen Gomes, OT

## 2020-03-23 NOTE — NURSING NOTE
Patient is seclusive to room and self  Pt is medication compliant and cooperative with care  Pt is out for meals  Pt is pleasant upon approach  Pt is currently denying all s/s at this time  Will continue to monitor

## 2020-03-24 VITALS
WEIGHT: 165 LBS | HEIGHT: 70 IN | DIASTOLIC BLOOD PRESSURE: 64 MMHG | OXYGEN SATURATION: 98 % | TEMPERATURE: 97.5 F | HEART RATE: 51 BPM | RESPIRATION RATE: 16 BRPM | SYSTOLIC BLOOD PRESSURE: 116 MMHG | BODY MASS INDEX: 23.62 KG/M2

## 2020-03-24 LAB
ATRIAL RATE: 62 BPM
P AXIS: 40 DEGREES
PR INTERVAL: 188 MS
QRS AXIS: -62 DEGREES
QRSD INTERVAL: 132 MS
QT INTERVAL: 450 MS
QTC INTERVAL: 456 MS
RPR SER QL: NORMAL
T WAVE AXIS: 28 DEGREES
VENTRICULAR RATE: 62 BPM

## 2020-03-24 PROCEDURE — 93010 ELECTROCARDIOGRAM REPORT: CPT | Performed by: INTERNAL MEDICINE

## 2020-03-24 PROCEDURE — 99238 HOSP IP/OBS DSCHRG MGMT 30/<: CPT | Performed by: PSYCHIATRY & NEUROLOGY

## 2020-03-24 RX ORDER — OLANZAPINE 20 MG/1
20 TABLET ORAL
Qty: 30 TABLET | Refills: 0 | Status: SHIPPED | OUTPATIENT
Start: 2020-03-24

## 2020-03-24 NOTE — DISCHARGE SUMMARY
Discharge Summary - Lakeisha Og [address]  81 y o  male MRN: [id number]  Unit/Bed#: [facility] 303-08 Encounter: [id number]     Admission Date: 3/17/2020         Discharge Date: 03/24/20    Attending Psychiatrist: Tiff Trevizo MD  Reason for Admission/HPI:     Copied from HPI on Admission   "Chief Complaint:  psychosis, agitation     (Source of information: patient refused assessment; chart reviewed)     HPI: Saturnino Wilcox  is a 81 y o  male with unknown prior psychiatric treatment who was bib police on a 518 and was activated by an acquaintance  He was eventually given the option of signing in for voluntary treatment which he did  The initial 302 states the petitioner has known him for 4 years, that he has been concerned over the past month regarding his ability to care for himself, has been delusional and aggressive  At this time patient refuses to cooperate with assessment and only states that he wishes to be discharged  Soon after this brief interaction, he got hostile, agitated, was yelling about leaving, hurling insults at staff, trying to push on the doors to leave  Security had to be called and he was given haldol 5mg IM  Staff have been noticing he is talking to himself while he is in his room alone  On review of ED records, he presented as unkempt, smelling of urine and feces, appearing malnourished, stating that "he refuses to get on that spacecraft," that "he hopes a car will hit and kill him " He arrived to the ED in restraints and remained agitated until he was medicated with ativan 1mg IM and geodon 20mg IM  ED crisis worker note states patient reported hearing voices, that he has been homeless  On arrival he reportedly had a small pocket knife on him  He mention to admitting nurse yeaterday that he has been having financial problems  He was reportedly oriented to place but not to time - could not identify month  He signed a 72 hour notice this morning   Additional historical and social information below was obtained by case management"      Meds/Allergies     current meds:   Current Facility-Administered Medications   Medication Dose Route Frequency    aluminum-magnesium hydroxide-simethicone (MYLANTA) 200-200-20 mg/5 mL oral suspension 30 mL  30 mL Oral Q4H PRN    haloperidol (HALDOL) tablet 5 mg  5 mg Oral Q6H PRN    Or    haloperidol lactate (HALDOL) injection 5 mg  5 mg Intramuscular Q6H PRN    hydrOXYzine HCL (ATARAX) tablet 25 mg  25 mg Oral Q6H PRN    influenza vaccine, high-dose (FLUZONE HIGH-DOSE) IM injection TRISHA 0 5 mL  0 5 mL Intramuscular Prior to discharge    LORazepam (ATIVAN) injection 2 mg  2 mg Intravenous Q6H PRN    mirtazapine (REMERON) tablet 7 5 mg  7 5 mg Oral HS PRN    nicotine polacrilex (NICORETTE) gum 2 mg  2 mg Oral Q2H PRN    OLANZapine (ZyPREXA) tablet 20 mg  20 mg Oral HS    Or    OLANZapine (ZyPREXA) IM injection 5 mg  5 mg Intramuscular HS    polyethylene glycol (MIRALAX) packet 17 g  17 g Oral Daily PRN       No Known Allergies    Objective     Vital signs in last 24 hours:  Temp:  [97 5 °F (36 4 °C)-98 6 °F (37 °C)] 97 5 °F (36 4 °C)  HR:  [51-86] 51  Resp:  [16-18] 16  BP: (116-145)/(64-76) 116/64      Intake/Output Summary (Last 24 hours) at 3/24/2020 1158  Last data filed at 3/23/2020 2146  Gross per 24 hour   Intake 1240 ml   Output    Net 1240 ml       Hospital Course: The patient was admitted to the inpatient psychiatric unit and started on every 15 minutes precautions  During the hospitalization the patient was attending individual therapy, group therapy, milieu therapy and occupational therapy  Psychiatric medications were titrated over the hospital stay  To address aggresive behavior, agitation, psychotic symptoms and disorganized behavior the patient was started on antipsychotic medication Zyprexa  Medication doses were titrated during the hospital course   Prior to beginning of treatment medications risks and benefits and possible side effects including risk of parkinsonian symptoms, Tardive Dyskinesia and metabolic syndrome related to treatment with antipsychotic medications and risk of cardiovascular events in elderly related to treatment with antipsychotic medications were reviewed with the patient  Upon admission the patient was confused, disorganized, psychotic, agitated, angry and labile  He was observed talking to himself frequently and appeared to be responding to internal stimuli  He had to have IM medications  for increased agitation at times  We obtained 2 physician opinions to force medications over objection  He then began taking his antipsychotic Zyprexa voluntarily  Patient's symptoms improved gradually over the hospital course  At the end of treatment the patient was doing well  He was no longer angry or agitated  He was calm and cooperated with staff although he did remain a bit guarded  His paranoia was less overt  He no longer appeared to be hallucinating  He was expressing readiness for discharge  He said he was going to be staying with a friend up in [facility] and was eager to see his dog again  Mood was stable at the time of discharge  The patient denied suicidal ideation, intent or plan at the time of discharge and denied homicidal ideation, intent or plan at the time of discharge  There was no overt psychosis at the time of discharge  Sleep and appetite were improved  The patient was tolerating medications and was not reporting any significant side effects at the time of discharge  Since the patient was doing well at the end of the hospitalization, treatment team felt that the patient could be safely discharged to outpatient care  The outpatient follow up was arranged by the unit  upon discharge      Mental Status at Time of Discharge:     Appearance Adequate hygiene and grooming   Behavior guarded   Mood euthymic   Speech Normal rate and volume   Affect constricted Thought Processes Goal directed and coherent   Thought Content Does not verbalize delusional material   Associations Tightly connected   Perceptual Disturbances Denies hallucinations and does not appear to be responding to internal stimuli   Risk Potential Suicidal/Homicidal Ideation - No evidence of suicidal or homicidal ideation and Patient does not verbalize suicidal or homicidal ideation  Risk of Violence - No evidence of risk for violence found on assessment  Risk of Self Mutilation - No evidence of risk for self mutilation found on assessment   Orientation oriented to person, place and time/date   Memory recent and remote memory: unable to assess due to lack of cooperation   Consciousness alert and awake   Attention/Concentration attention span and concentration appear shorter than expected for age   Insight improving   Judgement improving   Muscle Strength and Gait normal gait/station and normal balance   Motor Activity no abnormal movements     Admission Diagnosis:  Principal Problem:    Psychotic disorder ([place] [id number] )  Active Problems:    Medical clearance for psychiatric admission    Hyperlipidemia    GERD (gastroesophageal reflux disease)    Hypokalemia    Tobacco abuse      Discharge Diagnosis:     Principal Problem:    Psychotic disorder ([place] [address] )  Active Problems:    Medical clearance for psychiatric admission    Hyperlipidemia    GERD (gastroesophageal reflux disease)    Hypokalemia    Tobacco abuse  Resolved Problems:    * No resolved hospital problems   *      Lab results:    Admission on 03/17/2020, Discharged on 03/24/2020   Component Date Value    Cholesterol 03/18/2020 143     Triglycerides 03/18/2020 119     HDL, Direct 03/18/2020 47     LDL Calculated 03/18/2020 72     Non-HDL-Chol (CHOL-HDL) 03/18/2020 96     Sodium 03/18/2020 137     Potassium 03/18/2020 4 2     Chloride 03/18/2020 107     CO2 03/18/2020 27     ANION GAP 03/18/2020 3*    BUN 03/18/2020 9     Creatinine 03/18/2020 0 68*    Glucose 03/18/2020 80     Glucose, Fasting 03/18/2020 80     Calcium 03/18/2020 8 8     eGFR 03/18/2020 99     MRSA Culture Only 03/18/2020 No Methicillin Resistant Staphlyococcus aureus (MRSA) isolated     RPR 03/23/2020 Non-Reactive     Vitamin B-12 03/23/2020 306     Folate 03/23/2020 6 6     Ventricular Rate 03/23/2020 62     Atrial Rate 03/23/2020 62     TN Interval 03/23/2020 188     QRSD Interval 03/23/2020 132     QT Interval 03/23/2020 450     QTC Interval 03/23/2020 456     P Axis 03/23/2020 40     QRS Axis 03/23/2020 -58     T Wave [place] 03/23/2020 28        Discharge Medications:    See after visit summary for reconciled discharge medications provided to patient and family  Discharge instructions/Information to patient and family:     See after visit summary for information provided to patient and family  Provisions for Follow-Up Care:    See after visit summary for information related to follow-up care and any pertinent home health orders  Discharge Statement     I spent 30 minutes discharging the patient  This time was spent on the day of discharge  I had direct contact with the patient on the day of discharge

## 2020-03-24 NOTE — TREATMENT TEAM
03/24/20 0819   Team Meeting   Meeting Type Daily Rounds   Team Members Present   Team Members Present Physician;Nurse;;Occupational Therapist;Other (Discipline and Name)   Physician Team Member Paris Sosa   Nursing Team Member Alina Monroy   Care Management Team Member Kaelyn Moreno   OT Team Member Stevan Wong   Other (Discipline and Name) Juliocesar Escobar     Reviewed case with team  Pt enjoys talking about his dog  D/C today 11am with [place]

## 2020-03-24 NOTE — NURSING NOTE
Belongings returned to patient  AVS and scripts reviewed with and given to patient  Patient left unit walking with  to  in lobby

## 2020-03-24 NOTE — PLAN OF CARE
Problem: DISCHARGE PLANNING - CARE MANAGEMENT  Goal: Discharge to post-acute care or home with appropriate resources  Description  INTERVENTIONS:  - Conduct assessment to determine patient/family and health care team treatment goals, and need for post-acute services based on payer coverage, community resources, and patient preferences, and barriers to discharge  - Address psychosocial, clinical, and financial barriers to discharge as identified in assessment in conjunction with the patient/family and health care team  - Arrange appropriate level of post-acute services according to patients   needs and preference and payer coverage in collaboration with the physician and health care team  - Communicate with and update the patient/family, physician, and health care team regarding progress on the discharge plan  - Arrange appropriate transportation to post-acute venues  Outcome: Completed     Patient in agreement

## 2020-03-24 NOTE — PLAN OF CARE
Problem: Alteration in Thoughts and Perception  Goal: Treatment Goal: Gain control of psychotic behaviors/thinking, reduce/eliminate presenting symptoms and demonstrate improved reality functioning upon discharge  Outcome: Adequate for Discharge  Goal: Verbalize thoughts and feelings  Description  Interventions:  - Promote a nonjudgmental and trusting relationship with the patient through active listening and therapeutic communication  - Assess patient's level of functioning, behavior and potential for risk  - Engage patient in 1 on 1 interactions  - Encourage patient to express fears, feelings, frustrations, and discuss symptoms    - [place] patient to reality, help patient recognize reality-based thinking   - Administer medications as ordered and assess for potential side effects  - Provide the patient education related to the signs and symptoms of the illness and desired effects of prescribed medications  Outcome: Adequate for Discharge  Goal: Refrain from acting on delusional thinking/internal stimuli  Description  Interventions:  - Monitor patient closely, per order   - Utilize least restrictive measures   - Set reasonable limits, give positive feedback for acceptable   - Administer medications as ordered and monitor of potential side effects  Outcome: Adequate for Discharge  Goal: Agree to be compliant with medication regime, as prescribed and report medication side effects  Description  Interventions:  - Offer appropriate PRN medication and supervise ingestion; conduct AIMS, as needed   Outcome: Adequate for Discharge  Goal: Attend and participate in unit activities, including therapeutic, recreational, and educational groups  Description  Interventions:  -Encourage Visitation and family involvement in care  Outcome: Adequate for Discharge  Goal: Recognize dysfunctional thoughts, communicate reality-based thoughts at the time of discharge  Description  Interventions:  - Provide medication and psycho-education to assist patient in compliance and developing insight into his/her illness   Outcome: Adequate for Discharge  Goal: Complete daily ADLs, including personal hygiene independently, as able  Description  Interventions:  - Observe, teach, and assist patient with ADLS  - Monitor and promote a balance of rest/activity, with adequate nutrition and elimination   Outcome: Adequate for Discharge     Problem: Ineffective Coping  Goal: Cooperates with admission process  Description  Interventions:   - Complete admission process  Outcome: Adequate for Discharge  Goal: Identifies ineffective coping skills  Outcome: Adequate for Discharge  Goal: Identifies healthy coping skills  Outcome: Adequate for Discharge  Goal: Demonstrates healthy coping skills  Outcome: Adequate for Discharge  Goal: Participates in unit activities  Description  Interventions:  - Provide therapeutic environment   - Provide required programming   - Redirect inappropriate behaviors   Outcome: Adequate for Discharge  Goal: Patient/Family participate in treatment and DC plans  Description  Interventions:  - Provide therapeutic environment  Outcome: Adequate for Discharge  Goal: Patient/Family verbalizes awareness of resources  Outcome: Adequate for Discharge  Goal: Understands least restrictive measures  Description  Interventions:  - Utilize least restrictive behavior  Outcome: Adequate for Discharge  Goal: Free from restraint events  Description  - Utilize least restrictive measures   - Provide behavioral interventions   - Redirect inappropriate behaviors   Outcome: Adequate for Discharge     Problem: Risk for Self Injury/Neglect  Goal: Treatment Goal: Remain safe during length of stay, learn and adopt new coping skills, and be free of self-injurious ideation, impulses and acts at the time of discharge  Outcome: Adequate for Discharge  Goal: Verbalize thoughts and feelings  Description  Interventions:  - Assess and re-assess patient's lethality and potential for self-injury  - Engage patient in 1:1 interactions, daily, for a minimum of 15 minutes  - Encourage patient to express feelings, fears, frustrations, hopes  - Establish rapport/trust with patient   Outcome: Adequate for Discharge  Goal: Refrain from harming self  Description  Interventions:  - Monitor patient closely, per order  - Develop a trusting relationship  - Supervise medication ingestion, monitor effects and side effects   Outcome: Adequate for Discharge  Goal: Attend and participate in unit activities, including therapeutic, recreational, and educational groups  Description  Interventions:  - Provide therapeutic and educational activities daily, encourage attendance and participation, and document same in the medical record  - Obtain collateral information, encourage visitation and family involvement in care   Outcome: Adequate for Discharge  Goal: Recognize maladaptive responses and adopt new coping mechanisms  Outcome: Adequate for Discharge  Goal: Complete daily ADLs, including personal hygiene independently, as able  Description  Interventions:  - Observe, teach, and assist patient with ADLS  - Monitor and promote a balance of rest/activity, with adequate nutrition and elimination  Outcome: Adequate for Discharge     Problem: Depression  Goal: Treatment Goal: Demonstrate behavioral control of depressive symptoms, verbalize feelings of improved mood/affect, and adopt new coping skills prior to discharge  Outcome: Adequate for Discharge  Goal: Verbalize thoughts and feelings  Description  Interventions:  - Assess and re-assess patient's level of risk   - Engage patient in 1:1 interactions, daily, for a minimum of 15 minutes   - Encourage patient to express feelings, fears, frustrations, hopes   Outcome: Adequate for Discharge  Goal: Refrain from harming self  Description  Interventions:  - Monitor patient closely, per order   - Supervise medication ingestion, monitor effects and side effects   Outcome: Adequate for Discharge  Goal: Refrain from isolation  Description  Interventions:  - Develop a trusting relationship   - Encourage socialization   Outcome: Adequate for Discharge  Goal: Refrain from self-neglect  Outcome: Adequate for Discharge  Goal: Attend and participate in unit activities, including therapeutic, recreational, and educational groups  Description  Interventions:  - Provide therapeutic and educational activities daily, encourage attendance and participation, and document same in the medical record   Outcome: Adequate for Discharge  Goal: Complete daily ADLs, including personal hygiene independently, as able  Description  Interventions:  - Observe, teach, and assist patient with ADLS  -  Monitor and promote a balance of rest/activity, with adequate nutrition and elimination   Outcome: Adequate for Discharge     Problem: Anxiety  Goal: Anxiety is at manageable level  Description  Interventions:  - Assess and monitor patient's anxiety level  - Monitor for signs and symptoms (heart palpitations, chest pain, shortness of breath, headaches, nausea, feeling jumpy, restlessness, irritable, apprehensive)  - Collaborate with interdisciplinary team and initiate plan and interventions as ordered    - [place] patient to unit/surroundings  - Explain treatment plan  - Encourage participation in care  - Encourage verbalization of concerns/fears  - Identify coping mechanisms  - Assist in developing anxiety-reducing skills  - Administer/offer alternative therapies  - Limit or eliminate stimulants  Outcome: Adequate for Discharge     Problem: Alteration in Orientation  Goal: Treatment Goal: Demonstrate a reduction of confusion and improved orientation to person, place, time and/or situation upon discharge, according to optimum baseline/ability  Outcome: Adequate for Discharge  Goal: Interact with staff daily  Description  Interventions:  - Assess and re-assess patient's level of orientation  - Engage patient in 1 on 1 interactions, daily, for a minimum of 15 minutes   - Establish rapport/trust with patient   Outcome: Adequate for Discharge  Goal: Express concerns related to confused thinking related to:  Description  Interventions:  - Encourage patient to express feelings, fears, frustrations, hopes  - Assign consistent caregivers   - [place]/re-orient patient as needed  - Allow comfort items, as appropriate  - Provide visual cues, signs, etc    Outcome: Adequate for Discharge  Goal: Allow medical examinations, as recommended  Description  Interventions:  - Provide physical/neurological exams and/or referrals, per provider   Outcome: Adequate for Discharge  Goal: Cooperate with recommended testing/procedures  Description  Interventions:  - Determine need for ancillary testing  - Observe for mental status changes  - Implement falls/precaution protocol   Outcome: Adequate for Discharge  Goal: Attend and participate in unit activities, including therapeutic, recreational, and educational groups  Description  Interventions:  - Provide therapeutic and educational activities daily, encourage attendance and participation, and document same in the medical record   - Provide appropriate opportunities for reminiscence   - Provide a consistent daily routine   - Encourage family contact/visitation   Outcome: Adequate for Discharge  Goal: Complete daily ADLs, including personal hygiene independently, as able  Description  Interventions:  - Observe, teach, and assist patient with ADLS  Outcome: Adequate for Discharge     Problem: DISCHARGE PLANNING - CARE MANAGEMENT  Goal: Discharge to post-acute care or home with appropriate resources  Description  INTERVENTIONS:  - Conduct assessment to determine patient/family and health care team treatment goals, and need for post-acute services based on payer coverage, community resources, and patient preferences, and barriers to discharge  - Address psychosocial, clinical, and financial barriers to discharge as identified in assessment in conjunction with the patient/family and health care team  - Arrange appropriate level of post-acute services according to patients   needs and preference and payer coverage in collaboration with the physician and health care team  - Communicate with and update the patient/family, physician, and health care team regarding progress on the discharge plan  - Arrange appropriate transportation to post-acute venues  Outcome: Adequate for Discharge

## 2020-03-24 NOTE — NURSING NOTE
Pt is calm, cooperative with care and medication compliant  Pt is visible on the unit walking the hallway  Pt comes out for meals, groups and to make needs known  Pt denies s/s including SI and HI  Will continue to monitor

## 2020-03-24 NOTE — CASE MANAGEMENT
Patient is being discharged, no SI/HI, no psychosis or A/V  Patient is in agreement with discharge  No questions verbalized  Patient provided with after care appointment, discharge instructions and prescriptions  IMM, core measures, transition of care, discharge instructions, and treatment plan complete  Patient will be transported by [facility] at 1000am for a discharge home with outpatient services  SW will continue to follow up as needed

## (undated) DEVICE — INTENDED FOR TISSUE SEPARATION, AND OTHER PROCEDURES THAT REQUIRE A SHARP SURGICAL BLADE TO PUNCTURE OR CUT.: Brand: BARD-PARKER SAFETY BLADES SIZE 15, STERILE

## (undated) DEVICE — GLOVE INDICATOR PI UNDERGLOVE SZ 8 BLUE

## (undated) DEVICE — MEDI-VAC YANKAUER SUCTION HANDLE W/STRAIGHT TIP & CONTROL VENT: Brand: CARDINAL HEALTH

## (undated) DEVICE — CYSTO TUBING SINGLE IRRIGATION

## (undated) DEVICE — TUBING SUCTION 5MM X 12 FT

## (undated) DEVICE — ACE WRAP 4 IN UNSTERILE

## (undated) DEVICE — OCCLUSIVE GAUZE STRIP,3% BISMUTH TRIBROMOPHENATE IN PETROLATUM BLEND: Brand: XEROFORM

## (undated) DEVICE — ACE WRAP 3 IN STERILE

## (undated) DEVICE — DISPOSABLE EQUIPMENT COVER: Brand: SMALL TOWEL DRAPE

## (undated) DEVICE — SPONGE PVP SCRUB WING STERILE

## (undated) DEVICE — PADDING CAST 4 IN  COTTON STRL

## (undated) DEVICE — GAUZE SPONGES,16 PLY: Brand: CURITY

## (undated) DEVICE — NEEDLE 25G X 1 1/2

## (undated) DEVICE — PACK PLASTIC HAND PBDS

## (undated) DEVICE — GLOVE SRG BIOGEL 7.5

## (undated) DEVICE — CHLORAPREP HI-LITE 26ML ORANGE

## (undated) DEVICE — IODOFORM PACKING STRIP: Brand: CURITY

## (undated) DEVICE — CUFF TOURNIQUET 18 X 4 IN QUICK CONNECT DISP 1 BLADDER